# Patient Record
Sex: MALE | Race: WHITE | Employment: FULL TIME | URBAN - NONMETROPOLITAN AREA
[De-identification: names, ages, dates, MRNs, and addresses within clinical notes are randomized per-mention and may not be internally consistent; named-entity substitution may affect disease eponyms.]

---

## 2018-01-25 ENCOUNTER — APPOINTMENT (OUTPATIENT)
Dept: CT IMAGING | Age: 33
End: 2018-01-25
Payer: COMMERCIAL

## 2018-01-25 ENCOUNTER — HOSPITAL ENCOUNTER (EMERGENCY)
Age: 33
Discharge: HOME | End: 2018-01-25
Attending: EMERGENCY MEDICINE
Payer: COMMERCIAL

## 2018-01-25 VITALS
OXYGEN SATURATION: 97 % | DIASTOLIC BLOOD PRESSURE: 84 MMHG | HEIGHT: 70 IN | RESPIRATION RATE: 16 BRPM | TEMPERATURE: 98.4 F | WEIGHT: 170 LBS | HEART RATE: 55 BPM | SYSTOLIC BLOOD PRESSURE: 122 MMHG | BODY MASS INDEX: 24.34 KG/M2

## 2018-01-25 DIAGNOSIS — Q24.8 PERICARDIAL CYST: ICD-10-CM

## 2018-01-25 DIAGNOSIS — N13.2 HYDRONEPHROSIS WITH URINARY OBSTRUCTION DUE TO URETERAL CALCULUS: Primary | ICD-10-CM

## 2018-01-25 LAB
ALBUMIN SERPL-MCNC: 4.3 G/DL (ref 3.5–5.2)
ALP BLD-CCNC: 62 U/L (ref 40–130)
ALT SERPL-CCNC: 37 U/L (ref 5–41)
ANION GAP SERPL CALCULATED.3IONS-SCNC: 16 MMOL/L (ref 7–19)
AST SERPL-CCNC: 33 U/L (ref 5–40)
BACTERIA: NEGATIVE /HPF
BASOPHILS ABSOLUTE: 0.1 K/UL (ref 0–0.2)
BASOPHILS RELATIVE PERCENT: 0.5 % (ref 0–1)
BILIRUB SERPL-MCNC: 0.5 MG/DL (ref 0.2–1.2)
BILIRUBIN URINE: NEGATIVE
BLOOD, URINE: ABNORMAL
BUN BLDV-MCNC: 22 MG/DL (ref 6–20)
CALCIUM SERPL-MCNC: 9.3 MG/DL (ref 8.6–10)
CHLORIDE BLD-SCNC: 104 MMOL/L (ref 98–111)
CLARITY: ABNORMAL
CO2: 20 MMOL/L (ref 22–29)
COLOR: YELLOW
CREAT SERPL-MCNC: 0.9 MG/DL (ref 0.5–1.2)
EOSINOPHILS ABSOLUTE: 0.2 K/UL (ref 0–0.6)
EOSINOPHILS RELATIVE PERCENT: 1.8 % (ref 0–5)
EPITHELIAL CELLS, UA: 1 /HPF (ref 0–5)
GFR NON-AFRICAN AMERICAN: >60
GLUCOSE BLD-MCNC: 128 MG/DL (ref 74–109)
GLUCOSE URINE: NEGATIVE MG/DL
HCT VFR BLD CALC: 46.2 % (ref 42–52)
HEMOGLOBIN: 15.8 G/DL (ref 14–18)
HYALINE CASTS: 3 /HPF (ref 0–8)
KETONES, URINE: NEGATIVE MG/DL
LEUKOCYTE ESTERASE, URINE: NEGATIVE
LIPASE: 143 U/L (ref 13–60)
LYMPHOCYTES ABSOLUTE: 4.5 K/UL (ref 1.1–4.5)
LYMPHOCYTES RELATIVE PERCENT: 48 % (ref 20–40)
MCH RBC QN AUTO: 30.6 PG (ref 27–31)
MCHC RBC AUTO-ENTMCNC: 34.2 G/DL (ref 33–37)
MCV RBC AUTO: 89.4 FL (ref 80–94)
MONOCYTES ABSOLUTE: 1.2 K/UL (ref 0–0.9)
MONOCYTES RELATIVE PERCENT: 13.3 % (ref 0–10)
NEUTROPHILS ABSOLUTE: 3.4 K/UL (ref 1.5–7.5)
NEUTROPHILS RELATIVE PERCENT: 36.2 % (ref 50–65)
NITRITE, URINE: NEGATIVE
PDW BLD-RTO: 13 % (ref 11.5–14.5)
PH UA: 6
PLATELET # BLD: 291 K/UL (ref 130–400)
PMV BLD AUTO: 9.5 FL (ref 9.4–12.4)
POTASSIUM SERPL-SCNC: 3.7 MMOL/L (ref 3.5–5)
PROTEIN UA: NEGATIVE MG/DL
RBC # BLD: 5.17 M/UL (ref 4.7–6.1)
RBC UA: 17 /HPF (ref 0–4)
SODIUM BLD-SCNC: 140 MMOL/L (ref 136–145)
SPECIFIC GRAVITY UA: 1.03
TOTAL PROTEIN: 7.2 G/DL (ref 6.6–8.7)
UROBILINOGEN, URINE: 0.2 E.U./DL
WBC # BLD: 9.3 K/UL (ref 4.8–10.8)
WBC UA: 3 /HPF (ref 0–5)

## 2018-01-25 PROCEDURE — 6370000000 HC RX 637 (ALT 250 FOR IP): Performed by: EMERGENCY MEDICINE

## 2018-01-25 PROCEDURE — 96375 TX/PRO/DX INJ NEW DRUG ADDON: CPT

## 2018-01-25 PROCEDURE — 36415 COLL VENOUS BLD VENIPUNCTURE: CPT

## 2018-01-25 PROCEDURE — 2580000003 HC RX 258: Performed by: EMERGENCY MEDICINE

## 2018-01-25 PROCEDURE — 81001 URINALYSIS AUTO W/SCOPE: CPT

## 2018-01-25 PROCEDURE — 99284 EMERGENCY DEPT VISIT MOD MDM: CPT

## 2018-01-25 PROCEDURE — 83690 ASSAY OF LIPASE: CPT

## 2018-01-25 PROCEDURE — 99284 EMERGENCY DEPT VISIT MOD MDM: CPT | Performed by: EMERGENCY MEDICINE

## 2018-01-25 PROCEDURE — 85025 COMPLETE CBC W/AUTO DIFF WBC: CPT

## 2018-01-25 PROCEDURE — 96374 THER/PROPH/DIAG INJ IV PUSH: CPT

## 2018-01-25 PROCEDURE — 80053 COMPREHEN METABOLIC PANEL: CPT

## 2018-01-25 PROCEDURE — 74150 CT ABDOMEN W/O CONTRAST: CPT

## 2018-01-25 PROCEDURE — 6360000002 HC RX W HCPCS: Performed by: EMERGENCY MEDICINE

## 2018-01-25 PROCEDURE — 96376 TX/PRO/DX INJ SAME DRUG ADON: CPT

## 2018-01-25 RX ORDER — TAMSULOSIN HYDROCHLORIDE 0.4 MG/1
0.4 CAPSULE ORAL DAILY
Qty: 30 CAPSULE | Refills: 3 | Status: SHIPPED | OUTPATIENT
Start: 2018-01-25 | End: 2018-03-02 | Stop reason: ALTCHOICE

## 2018-01-25 RX ORDER — TAMSULOSIN HYDROCHLORIDE 0.4 MG/1
0.4 CAPSULE ORAL ONCE
Status: COMPLETED | OUTPATIENT
Start: 2018-01-25 | End: 2018-01-25

## 2018-01-25 RX ORDER — MORPHINE SULFATE 10 MG/ML
6 INJECTION, SOLUTION INTRAMUSCULAR; INTRAVENOUS ONCE
Status: COMPLETED | OUTPATIENT
Start: 2018-01-25 | End: 2018-01-25

## 2018-01-25 RX ORDER — DEXTROAMPHETAMINE SACCHARATE, AMPHETAMINE ASPARTATE, DEXTROAMPHETAMINE SULFATE AND AMPHETAMINE SULFATE 7.5; 7.5; 7.5; 7.5 MG/1; MG/1; MG/1; MG/1
30 TABLET ORAL DAILY
COMMUNITY

## 2018-01-25 RX ORDER — HYDROCODONE BITARTRATE AND ACETAMINOPHEN 5; 325 MG/1; MG/1
1 TABLET ORAL ONCE
Status: DISCONTINUED | OUTPATIENT
Start: 2018-01-25 | End: 2018-01-25

## 2018-01-25 RX ORDER — ONDANSETRON 4 MG/1
4 TABLET, ORALLY DISINTEGRATING ORAL EVERY 8 HOURS PRN
Qty: 30 TABLET | Refills: 0 | Status: SHIPPED | OUTPATIENT
Start: 2018-01-25

## 2018-01-25 RX ORDER — MORPHINE SULFATE 4 MG/ML
4 INJECTION, SOLUTION INTRAMUSCULAR; INTRAVENOUS ONCE
Status: COMPLETED | OUTPATIENT
Start: 2018-01-25 | End: 2018-01-25

## 2018-01-25 RX ORDER — KETOROLAC TROMETHAMINE 30 MG/ML
15 INJECTION, SOLUTION INTRAMUSCULAR; INTRAVENOUS ONCE
Status: COMPLETED | OUTPATIENT
Start: 2018-01-25 | End: 2018-01-25

## 2018-01-25 RX ORDER — ONDANSETRON 2 MG/ML
4 INJECTION INTRAMUSCULAR; INTRAVENOUS ONCE
Status: COMPLETED | OUTPATIENT
Start: 2018-01-25 | End: 2018-01-25

## 2018-01-25 RX ORDER — HYDROCODONE BITARTRATE AND ACETAMINOPHEN 5; 325 MG/1; MG/1
1 TABLET ORAL EVERY 6 HOURS PRN
Qty: 12 TABLET | Refills: 0 | Status: SHIPPED | OUTPATIENT
Start: 2018-01-25 | End: 2018-01-28

## 2018-01-25 RX ORDER — PROMETHAZINE HYDROCHLORIDE 25 MG/ML
12.5 INJECTION, SOLUTION INTRAMUSCULAR; INTRAVENOUS ONCE
Status: COMPLETED | OUTPATIENT
Start: 2018-01-25 | End: 2018-01-25

## 2018-01-25 RX ORDER — 0.9 % SODIUM CHLORIDE 0.9 %
1000 INTRAVENOUS SOLUTION INTRAVENOUS ONCE
Status: COMPLETED | OUTPATIENT
Start: 2018-01-25 | End: 2018-01-25

## 2018-01-25 RX ORDER — DOCUSATE SODIUM 100 MG/1
100 CAPSULE, LIQUID FILLED ORAL 2 TIMES DAILY
Qty: 30 CAPSULE | Refills: 0 | Status: SHIPPED | OUTPATIENT
Start: 2018-01-25 | End: 2022-03-11

## 2018-01-25 RX ADMIN — PROMETHAZINE HYDROCHLORIDE 12.5 MG: 25 INJECTION INTRAMUSCULAR; INTRAVENOUS at 06:46

## 2018-01-25 RX ADMIN — SODIUM CHLORIDE 1000 ML: 9 INJECTION, SOLUTION INTRAVENOUS at 05:03

## 2018-01-25 RX ADMIN — MORPHINE SULFATE 6 MG: 10 INJECTION INTRAVENOUS at 05:04

## 2018-01-25 RX ADMIN — KETOROLAC TROMETHAMINE 15 MG: 30 INJECTION, SOLUTION INTRAMUSCULAR at 05:07

## 2018-01-25 RX ADMIN — TAMSULOSIN HYDROCHLORIDE 0.4 MG: 0.4 CAPSULE ORAL at 05:07

## 2018-01-25 RX ADMIN — Medication 4 MG: at 05:21

## 2018-01-25 RX ADMIN — ONDANSETRON 4 MG: 2 INJECTION INTRAMUSCULAR; INTRAVENOUS at 05:04

## 2018-01-25 RX ADMIN — Medication 4 MG: at 06:54

## 2018-01-25 ASSESSMENT — PAIN DESCRIPTION - ORIENTATION: ORIENTATION: RIGHT

## 2018-01-25 ASSESSMENT — PAIN DESCRIPTION - DESCRIPTORS: DESCRIPTORS: STABBING

## 2018-01-25 ASSESSMENT — PAIN SCALES - GENERAL
PAINLEVEL_OUTOF10: 10
PAINLEVEL_OUTOF10: 5
PAINLEVEL_OUTOF10: 10
PAINLEVEL_OUTOF10: 6
PAINLEVEL_OUTOF10: 7
PAINLEVEL_OUTOF10: 6

## 2018-01-25 ASSESSMENT — PAIN DESCRIPTION - LOCATION: LOCATION: ABDOMEN

## 2018-01-25 ASSESSMENT — ENCOUNTER SYMPTOMS
VOMITING: 0
SHORTNESS OF BREATH: 0

## 2018-01-25 NOTE — ED PROVIDER NOTES
amphetamine-dextroamphetamine (ADDERALL) 30 MG tablet Take 30 mg by mouth daily. Historical Med             ALLERGIES     Review of patient's allergies indicates no known allergies. FAMILY HISTORY     No family history on file. SOCIAL HISTORY       Social History     Social History    Marital status:      Spouse name: N/A    Number of children: N/A    Years of education: N/A     Social History Main Topics    Smoking status: Not on file    Smokeless tobacco: Not on file    Alcohol use Not on file    Drug use: Unknown    Sexual activity: Not on file     Other Topics Concern    Not on file     Social History Narrative    No narrative on file       SCREENINGS             PHYSICAL EXAM    (up to 7 for level 4, 8 or more for level 5)     ED Triage Vitals   BP Temp Temp Source Pulse Resp SpO2 Height Weight   01/25/18 0454 01/25/18 0454 01/25/18 0454 01/25/18 0457 01/25/18 0454 01/25/18 0454 01/25/18 0454 01/25/18 0454   132/89 97.4 °F (36.3 °C) Oral 55 20 98 % 5' 10\" (1.778 m) 170 lb (77.1 kg)       Physical Exam   Constitutional: He is oriented to person, place, and time. He appears well-developed and well-nourished. In pain   HENT:   Head: Normocephalic and atraumatic. Eyes: EOM are normal. Pupils are equal, round, and reactive to light. Neck: Normal range of motion. Neck supple. Cardiovascular: Normal rate, regular rhythm and normal heart sounds. Pulmonary/Chest: Effort normal and breath sounds normal. No respiratory distress. He has no wheezes. Abdominal: Soft. Bowel sounds are normal. He exhibits no distension. There is no tenderness. There is no rebound. Musculoskeletal: He exhibits no edema or deformity. Mild right CVA tenderness   Neurological: He is alert and oriented to person, place, and time. Skin: Skin is warm and dry. Psychiatric:   Anxious in pain   Nursing note and vitals reviewed.       DIAGNOSTIC RESULTS     EKG: All EKG's are interpreted by the Emergency Glucose 128 (*)     BUN 22 (*)     All other components within normal limits   LIPASE - Abnormal; Notable for the following:     Lipase 143 (*)     All other components within normal limits   URINALYSIS - Abnormal; Notable for the following:     Clarity, UA TURBID (*)     Blood, Urine LARGE (*)     All other components within normal limits   MICROSCOPIC URINALYSIS - Abnormal; Notable for the following:     RBC, UA 17 (*)     All other components within normal limits       All other labs were within normal range or not returned as of this dictation. EMERGENCY DEPARTMENT COURSE and DIFFERENTIAL DIAGNOSIS/MDM:   Vitals:    Vitals:    01/25/18 0457 01/25/18 0541 01/25/18 0649 01/25/18 0834   BP:  (!) 121/90 (!) 122/90 122/84   Pulse: 55 61 52 55   Resp:  18 18 16   Temp:    98.4 °F (36.9 °C)   TempSrc:       SpO2:  97% 97% 97%   Weight:       Height:           MDM  Number of Diagnoses or Management Options  Hydronephrosis with urinary obstruction due to ureteral calculus:   Pericardial cyst:   Diagnosis management comments: Pt with UVJ R stone, IV pain and nausea meds. Had bout of n/v at time of DC so gave more meds. Turned over to Dr Kanu Marte to admit if needs pain control, but we will give it a couple more hours and if he is better can be dc to outpatient. Dr Kanu Marte to reassess after 0730. Discussed CT pericardial cyst and follow up with family as well, incidental.        Amount and/or Complexity of Data Reviewed  Clinical lab tests: ordered and reviewed  Tests in the radiology section of CPT®: ordered and reviewed  Obtain history from someone other than the patient: yes    Patient Progress  Patient progress: improved        CONSULTS:  None    PROCEDURES:  Unless otherwise noted below, none     Procedures    FINAL IMPRESSION      1. Hydronephrosis with urinary obstruction due to ureteral calculus    2.  Pericardial cyst          DISPOSITION/PLAN   DISPOSITION Decision To Discharge 01/25/2018 08:06:17

## 2018-01-25 NOTE — ED NOTES
Pt is resting comfortably in bed. Pt shows no s/s of distress. Call light is in reach of pt and pt encouraged to call if he needs anything.       Saturnino Ponce RN  01/25/18 1805

## 2018-01-25 NOTE — ED PROVIDER NOTES
Lymphocytes % 48.0 (*)     Monocytes % 13.3 (*)     Monocytes # 1.20 (*)     All other components within normal limits   COMPREHENSIVE METABOLIC PANEL - Abnormal; Notable for the following:     CO2 20 (*)     Glucose 128 (*)     BUN 22 (*)     All other components within normal limits   LIPASE - Abnormal; Notable for the following:     Lipase 143 (*)     All other components within normal limits   URINALYSIS - Abnormal; Notable for the following:     Clarity, UA TURBID (*)     Blood, Urine LARGE (*)     All other components within normal limits   MICROSCOPIC URINALYSIS - Abnormal; Notable for the following:     RBC, UA 17 (*)     All other components within normal limits       All other labs were within normal range or not returned as of this dictation.     EMERGENCY DEPARTMENT COURSE and DIFFERENTIAL DIAGNOSIS/MDM:   Vitals:    Vitals:    01/25/18 0454 01/25/18 0457 01/25/18 0541 01/25/18 0649   BP: 132/89  (!) 121/90 (!) 122/90   Pulse:  55 61 52   Resp: 20 18 18   Temp: 97.4 °F (36.3 °C)      TempSrc: Oral      SpO2: 98%  97% 97%   Weight: 170 lb (77.1 kg)      Height: 5' 10\" (1.778 m)          MDM  Number of Diagnoses or Management Options  Hydronephrosis with urinary obstruction due to ureteral calculus:   Pericardial cyst:      Amount and/or Complexity of Data Reviewed  Clinical lab tests: reviewed  Tests in the radiology section of CPT®: reviewed  Independent visualization of images, tracings, or specimens: yes        Right 3-4 mm UVJ stone, patient was still having some mild pain however is resting comfortably on my evaluation, family wants to make sure his pain control before going home so we'll continue to monitor for a little longer, 0741    Patient's pain has been well-controlled since taking over care he has had no episodes of vomiting and is ready to go home, discussed return precautions and follow-up, he and family are agreeable with plan    CONSULTS:  None    PROCEDURES:  Unless otherwise noted below, none     Procedures    FINAL IMPRESSION      1. Hydronephrosis with urinary obstruction due to ureteral calculus    2. Pericardial cyst          DISPOSITION/PLAN   DISPOSITION     PATIENT REFERRED TO:  Amaya Blanco MD  [address] [place] [address], [facility] [phone]    Schedule an appointment as soon as possible for a visit   MON if still having issues and pain    MD Escobar Fox , [facility]    Schedule an appointment as soon as possible for a visit   for incidental finding of pericardial cyst on CT      DISCHARGE MEDICATIONS:  New Prescriptions    DOCUSATE SODIUM (COLACE) 100 MG CAPSULE    Take 1 capsule by mouth 2 times daily    HYDROCODONE-ACETAMINOPHEN (NORCO) 5-325 MG PER TABLET    Take 1 tablet by mouth every 6 hours as needed for Pain for up to 3 days . Take lowest dose possible to manage pain. ONDANSETRON (ZOFRAN ODT) 4 MG DISINTEGRATING TABLET    Take 1 tablet by mouth every 8 hours as needed for Nausea or Vomiting    TAMSULOSIN (FLOMAX) 0.4 MG CAPSULE    Take 1 capsule by mouth daily     Attestation: The Prescription Monitoring Report for this patient was reviewed today. ([id number]) Juanis Bruce MD)  Documentation: Possible medication side effects, risk of tolerance and/or dependence, and alternative treatments discussed., No signs of potential drug abuse or diversion identified.  Juanis Bruce MD)    (Please note that portions of this note were completed with a voice recognition program.  Efforts were made to edit the dictations but occasionally words are mis-transcribed.)    Kiki Marte MD (electronically signed)  Attending Emergency Physician         Wenceslao Infante MD  01/25/18 8835

## 2018-03-02 ENCOUNTER — OFFICE VISIT (OUTPATIENT)
Dept: CARDIOLOGY | Age: 33
End: 2018-03-02
Payer: COMMERCIAL

## 2018-03-02 VITALS
WEIGHT: 169 LBS | BODY MASS INDEX: 24.2 KG/M2 | SYSTOLIC BLOOD PRESSURE: 120 MMHG | HEART RATE: 52 BPM | DIASTOLIC BLOOD PRESSURE: 68 MMHG | HEIGHT: 70 IN

## 2018-03-02 DIAGNOSIS — R00.2 PALPITATION: ICD-10-CM

## 2018-03-02 DIAGNOSIS — Q24.8 PERICARDIAL CYST: Primary | ICD-10-CM

## 2018-03-02 PROCEDURE — G8420 CALC BMI NORM PARAMETERS: HCPCS | Performed by: CLINICAL NURSE SPECIALIST

## 2018-03-02 PROCEDURE — G8427 DOCREV CUR MEDS BY ELIG CLIN: HCPCS | Performed by: CLINICAL NURSE SPECIALIST

## 2018-03-02 PROCEDURE — G8484 FLU IMMUNIZE NO ADMIN: HCPCS | Performed by: CLINICAL NURSE SPECIALIST

## 2018-03-02 PROCEDURE — 1036F TOBACCO NON-USER: CPT | Performed by: CLINICAL NURSE SPECIALIST

## 2018-03-02 PROCEDURE — 93000 ELECTROCARDIOGRAM COMPLETE: CPT | Performed by: CLINICAL NURSE SPECIALIST

## 2018-03-02 PROCEDURE — 99203 OFFICE O/P NEW LOW 30 MIN: CPT | Performed by: CLINICAL NURSE SPECIALIST

## 2018-03-12 ENCOUNTER — HOSPITAL ENCOUNTER (OUTPATIENT)
Dept: CT IMAGING | Age: 33
Discharge: HOME | End: 2018-03-12
Payer: COMMERCIAL

## 2018-03-12 DIAGNOSIS — Q24.8 PERICARDIAL CYST: ICD-10-CM

## 2018-03-12 PROCEDURE — 71270 CT THORAX DX C-/C+: CPT

## 2018-03-12 PROCEDURE — 6360000004 HC RX CONTRAST MEDICATION: Performed by: CLINICAL NURSE SPECIALIST

## 2018-03-12 RX ADMIN — IOPAMIDOL 60 ML: 755 INJECTION, SOLUTION INTRAVENOUS at 12:06

## 2018-03-13 DIAGNOSIS — Q24.8 PERICARDIAL CYST: Primary | ICD-10-CM

## 2018-03-20 ENCOUNTER — TELEPHONE (OUTPATIENT)
Dept: CARDIOTHORACIC SURGERY | Age: 33
End: 2018-03-20

## 2018-03-27 ENCOUNTER — OFFICE VISIT (OUTPATIENT)
Dept: CARDIOTHORACIC SURGERY | Age: 33
End: 2018-03-27
Payer: COMMERCIAL

## 2018-03-27 VITALS
HEIGHT: 70 IN | SYSTOLIC BLOOD PRESSURE: 116 MMHG | HEART RATE: 80 BPM | DIASTOLIC BLOOD PRESSURE: 80 MMHG | WEIGHT: 166 LBS | BODY MASS INDEX: 23.77 KG/M2 | OXYGEN SATURATION: 97 %

## 2018-03-27 DIAGNOSIS — Q24.8 PERICARDIAL CYST: Primary | ICD-10-CM

## 2018-03-27 PROCEDURE — G8427 DOCREV CUR MEDS BY ELIG CLIN: HCPCS | Performed by: THORACIC SURGERY (CARDIOTHORACIC VASCULAR SURGERY)

## 2018-03-27 PROCEDURE — G8420 CALC BMI NORM PARAMETERS: HCPCS | Performed by: THORACIC SURGERY (CARDIOTHORACIC VASCULAR SURGERY)

## 2018-03-27 PROCEDURE — G8484 FLU IMMUNIZE NO ADMIN: HCPCS | Performed by: THORACIC SURGERY (CARDIOTHORACIC VASCULAR SURGERY)

## 2018-03-27 PROCEDURE — 99203 OFFICE O/P NEW LOW 30 MIN: CPT | Performed by: THORACIC SURGERY (CARDIOTHORACIC VASCULAR SURGERY)

## 2018-03-27 PROCEDURE — 1036F TOBACCO NON-USER: CPT | Performed by: THORACIC SURGERY (CARDIOTHORACIC VASCULAR SURGERY)

## 2018-08-31 ENCOUNTER — APPOINTMENT (OUTPATIENT)
Dept: CT IMAGING | Age: 33
End: 2018-08-31
Payer: COMMERCIAL

## 2018-08-31 ENCOUNTER — HOSPITAL ENCOUNTER (EMERGENCY)
Age: 33
Discharge: HOME | End: 2018-09-01
Attending: EMERGENCY MEDICINE
Payer: COMMERCIAL

## 2018-08-31 DIAGNOSIS — N20.0 KIDNEY STONE: ICD-10-CM

## 2018-08-31 DIAGNOSIS — R10.9 LEFT FLANK PAIN: Primary | ICD-10-CM

## 2018-08-31 LAB
ALBUMIN SERPL-MCNC: 4.9 G/DL (ref 3.5–5.2)
ALP BLD-CCNC: 67 U/L (ref 40–130)
ALT SERPL-CCNC: 39 U/L (ref 5–41)
ANION GAP SERPL CALCULATED.3IONS-SCNC: 17 MMOL/L (ref 7–19)
AST SERPL-CCNC: 32 U/L (ref 5–40)
BASOPHILS ABSOLUTE: 0.1 K/UL (ref 0–0.2)
BASOPHILS RELATIVE PERCENT: 0.5 % (ref 0–1)
BILIRUB SERPL-MCNC: 0.8 MG/DL (ref 0.2–1.2)
BUN BLDV-MCNC: 20 MG/DL (ref 6–20)
CALCIUM SERPL-MCNC: 10 MG/DL (ref 8.6–10)
CHLORIDE BLD-SCNC: 101 MMOL/L (ref 98–111)
CO2: 23 MMOL/L (ref 22–29)
CREAT SERPL-MCNC: 1.1 MG/DL (ref 0.5–1.2)
EOSINOPHILS ABSOLUTE: 0.1 K/UL (ref 0–0.6)
EOSINOPHILS RELATIVE PERCENT: 1 % (ref 0–5)
GFR NON-AFRICAN AMERICAN: >60
GLUCOSE BLD-MCNC: 114 MG/DL (ref 74–109)
HCT VFR BLD CALC: 50 % (ref 42–52)
HEMOGLOBIN: 16.9 G/DL (ref 14–18)
LYMPHOCYTES ABSOLUTE: 3.8 K/UL (ref 1.1–4.5)
LYMPHOCYTES RELATIVE PERCENT: 36.2 % (ref 20–40)
MCH RBC QN AUTO: 30.7 PG (ref 27–31)
MCHC RBC AUTO-ENTMCNC: 33.8 G/DL (ref 33–37)
MCV RBC AUTO: 90.9 FL (ref 80–94)
MONOCYTES ABSOLUTE: 1.1 K/UL (ref 0–0.9)
MONOCYTES RELATIVE PERCENT: 10.8 % (ref 0–10)
NEUTROPHILS ABSOLUTE: 5.3 K/UL (ref 1.5–7.5)
NEUTROPHILS RELATIVE PERCENT: 51.2 % (ref 50–65)
PDW BLD-RTO: 12.6 % (ref 11.5–14.5)
PLATELET # BLD: 297 K/UL (ref 130–400)
PMV BLD AUTO: 9.1 FL (ref 9.4–12.4)
POTASSIUM SERPL-SCNC: 3.5 MMOL/L (ref 3.5–5)
RBC # BLD: 5.5 M/UL (ref 4.7–6.1)
SODIUM BLD-SCNC: 141 MMOL/L (ref 136–145)
TOTAL PROTEIN: 8.1 G/DL (ref 6.6–8.7)
WBC # BLD: 10.4 K/UL (ref 4.8–10.8)

## 2018-08-31 PROCEDURE — 85025 COMPLETE CBC W/AUTO DIFF WBC: CPT

## 2018-08-31 PROCEDURE — 6360000002 HC RX W HCPCS: Performed by: EMERGENCY MEDICINE

## 2018-08-31 PROCEDURE — 96375 TX/PRO/DX INJ NEW DRUG ADDON: CPT

## 2018-08-31 PROCEDURE — 36415 COLL VENOUS BLD VENIPUNCTURE: CPT

## 2018-08-31 PROCEDURE — 96374 THER/PROPH/DIAG INJ IV PUSH: CPT

## 2018-08-31 PROCEDURE — 99284 EMERGENCY DEPT VISIT MOD MDM: CPT

## 2018-08-31 PROCEDURE — 80053 COMPREHEN METABOLIC PANEL: CPT

## 2018-08-31 PROCEDURE — 99285 EMERGENCY DEPT VISIT HI MDM: CPT | Performed by: EMERGENCY MEDICINE

## 2018-08-31 PROCEDURE — 6360000002 HC RX W HCPCS

## 2018-08-31 PROCEDURE — 74150 CT ABDOMEN W/O CONTRAST: CPT

## 2018-08-31 RX ORDER — OXYCODONE HYDROCHLORIDE AND ACETAMINOPHEN 5; 325 MG/1; MG/1
1 TABLET ORAL EVERY 6 HOURS PRN
Qty: 12 TABLET | Refills: 0 | Status: SHIPPED | OUTPATIENT
Start: 2018-08-31 | End: 2018-09-03

## 2018-08-31 RX ORDER — KETOROLAC TROMETHAMINE 30 MG/ML
30 INJECTION, SOLUTION INTRAMUSCULAR; INTRAVENOUS ONCE
Status: COMPLETED | OUTPATIENT
Start: 2018-08-31 | End: 2018-08-31

## 2018-08-31 RX ORDER — LORAZEPAM 2 MG/ML
1 INJECTION INTRAMUSCULAR ONCE
Status: COMPLETED | OUTPATIENT
Start: 2018-08-31 | End: 2018-08-31

## 2018-08-31 RX ORDER — MORPHINE SULFATE/0.9% NACL/PF 1 MG/ML
4 SYRINGE (ML) INJECTION ONCE
Status: COMPLETED | OUTPATIENT
Start: 2018-08-31 | End: 2018-08-31

## 2018-08-31 RX ORDER — ONDANSETRON 2 MG/ML
INJECTION INTRAMUSCULAR; INTRAVENOUS
Status: COMPLETED
Start: 2018-08-31 | End: 2018-08-31

## 2018-08-31 RX ORDER — KETOROLAC TROMETHAMINE 10 MG/1
10 TABLET, FILM COATED ORAL EVERY 6 HOURS PRN
Qty: 15 TABLET | Refills: 0 | Status: SHIPPED | OUTPATIENT
Start: 2018-08-31

## 2018-08-31 RX ORDER — TAMSULOSIN HYDROCHLORIDE 0.4 MG/1
0.4 CAPSULE ORAL DAILY
Qty: 10 CAPSULE | Refills: 0 | Status: SHIPPED | OUTPATIENT
Start: 2018-08-31

## 2018-08-31 RX ORDER — ONDANSETRON 2 MG/ML
4 INJECTION INTRAMUSCULAR; INTRAVENOUS ONCE
Status: COMPLETED | OUTPATIENT
Start: 2018-08-31 | End: 2018-08-31

## 2018-08-31 RX ORDER — MORPHINE SULFATE/0.9% NACL/PF 1 MG/ML
SYRINGE (ML) INJECTION
Status: COMPLETED
Start: 2018-08-31 | End: 2018-08-31

## 2018-08-31 RX ADMIN — Medication 4 MG: at 21:10

## 2018-08-31 RX ADMIN — LORAZEPAM 1 MG: 2 INJECTION INTRAMUSCULAR; INTRAVENOUS at 21:21

## 2018-08-31 RX ADMIN — ONDANSETRON 4 MG: 2 INJECTION INTRAMUSCULAR; INTRAVENOUS at 21:10

## 2018-08-31 RX ADMIN — Medication 1 MG: at 21:21

## 2018-08-31 RX ADMIN — KETOROLAC TROMETHAMINE 30 MG: 30 INJECTION, SOLUTION INTRAMUSCULAR; INTRAVENOUS at 21:21

## 2018-08-31 RX ADMIN — ONDANSETRON HYDROCHLORIDE 4 MG: 2 INJECTION, SOLUTION INTRAMUSCULAR; INTRAVENOUS at 21:10

## 2018-08-31 ASSESSMENT — PAIN DESCRIPTION - PAIN TYPE: TYPE: ACUTE PAIN

## 2018-08-31 ASSESSMENT — PAIN SCALES - GENERAL
PAINLEVEL_OUTOF10: 10
PAINLEVEL_OUTOF10: 5
PAINLEVEL_OUTOF10: 10

## 2018-09-01 VITALS
RESPIRATION RATE: 16 BRPM | WEIGHT: 160 LBS | BODY MASS INDEX: 22.9 KG/M2 | OXYGEN SATURATION: 99 % | DIASTOLIC BLOOD PRESSURE: 80 MMHG | HEIGHT: 70 IN | HEART RATE: 93 BPM | TEMPERATURE: 98.7 F | SYSTOLIC BLOOD PRESSURE: 109 MMHG

## 2018-09-01 ASSESSMENT — ENCOUNTER SYMPTOMS
NAUSEA: 1
VOMITING: 1
ABDOMINAL PAIN: 1
WHEEZING: 0
SINUS PRESSURE: 0
BACK PAIN: 0
CONSTIPATION: 0
DIARRHEA: 0
COLOR CHANGE: 0
SHORTNESS OF BREATH: 0
PHOTOPHOBIA: 0
EYE PAIN: 0
CHEST TIGHTNESS: 0
TROUBLE SWALLOWING: 0

## 2018-09-01 ASSESSMENT — PAIN SCALES - GENERAL: PAINLEVEL_OUTOF10: 5

## 2018-09-01 ASSESSMENT — PAIN DESCRIPTION - PAIN TYPE: TYPE: ACUTE PAIN

## 2018-09-01 NOTE — ED PROVIDER NOTES
[facility]  eMERGENCY dEPARTMENT eNCOUnter      Pt Name: Gila Sandoval  MRN: [id number]  [place] 1985  Date of evaluation: 8/31/2018  Provider: Annika Denson MD    [address] [place] [address]       Chief Complaint   Patient presents with    Flank Pain     pt presents to ED with c/oleft flank pain that started tonight. know kidney stone         HISTORY OF PRESENT ILLNESS   (Location/Symptom, Timing/Onset, Context/Setting, Quality, Duration, Modifying Factors, Severity)  Note limiting factors. Gila Sandoval is a 35 y.o. male who presents to the emergency department for left flank pain. He has a hx of renal stones with the last one being approx 8 months ago. States that the pain began this evening. Pain was typical for renal stone. It is sharp with radiation to his left testicle. He is actively vomiting. HPI    Nursing Notes were reviewed. REVIEW OF SYSTEMS    (2-9 systems for level 4, 10 or more for level 5)     Review of Systems   Constitutional: Negative for fatigue and fever. HENT: Negative for congestion, drooling, sinus pressure and trouble swallowing. Eyes: Negative for photophobia, pain and visual disturbance. Respiratory: Negative for chest tightness, shortness of breath and wheezing. Cardiovascular: Negative for chest pain, palpitations and leg swelling. Gastrointestinal: Positive for abdominal pain, nausea and vomiting. Negative for constipation and diarrhea. Genitourinary: Positive for flank pain (left) and testicular pain (left). Negative for dysuria and urgency. Musculoskeletal: Negative for arthralgias, back pain, myalgias, neck pain and neck stiffness. Skin: Negative for color change, pallor and rash. Neurological: Negative for dizziness, facial asymmetry, speech difficulty, weakness, light-headedness, numbness and headaches. Psychiatric/Behavioral: Negative for agitation, confusion, hallucinations and suicidal ideas. The patient is not nervous/anxious.

## 2020-11-02 ENCOUNTER — OFFICE VISIT (OUTPATIENT)
Age: 35
End: 2020-11-02

## 2020-11-02 VITALS — TEMPERATURE: 96.9 F | OXYGEN SATURATION: 98 % | HEART RATE: 63 BPM

## 2020-11-02 PROCEDURE — 99999 PR OFFICE/OUTPT VISIT,PROCEDURE ONLY: CPT | Performed by: NURSE PRACTITIONER

## 2020-11-05 LAB — SARS-COV-2, NAA: DETECTED

## 2021-01-11 ENCOUNTER — TELEPHONE (OUTPATIENT)
Dept: FAMILY MEDICINE CLINIC | Facility: CLINIC | Age: 36
End: 2021-01-11

## 2021-01-11 ENCOUNTER — OFFICE VISIT (OUTPATIENT)
Dept: FAMILY MEDICINE CLINIC | Facility: CLINIC | Age: 36
End: 2021-01-11

## 2021-01-11 VITALS
HEART RATE: 56 BPM | OXYGEN SATURATION: 98 % | TEMPERATURE: 98.5 F | WEIGHT: 181.5 LBS | BODY MASS INDEX: 25.98 KG/M2 | RESPIRATION RATE: 16 BRPM | SYSTOLIC BLOOD PRESSURE: 110 MMHG | DIASTOLIC BLOOD PRESSURE: 77 MMHG | HEIGHT: 70 IN

## 2021-01-11 DIAGNOSIS — F90.2 ATTENTION DEFICIT HYPERACTIVITY DISORDER (ADHD), COMBINED TYPE: Primary | ICD-10-CM

## 2021-01-11 PROBLEM — F90.9 ADULT ATTENTION DEFICIT HYPERACTIVITY DISORDER: Status: ACTIVE | Noted: 2019-06-03

## 2021-01-11 PROBLEM — F90.9 ATTENTION DEFICIT HYPERACTIVITY DISORDER (ADHD): Status: ACTIVE | Noted: 2021-01-11

## 2021-01-11 PROBLEM — G47.00 INSOMNIA: Status: ACTIVE | Noted: 2021-01-11

## 2021-01-11 PROBLEM — R51.9 GENERALIZED HEADACHES: Status: ACTIVE | Noted: 2021-01-11

## 2021-01-11 PROCEDURE — 99214 OFFICE O/P EST MOD 30 MIN: CPT | Performed by: NURSE PRACTITIONER

## 2021-01-11 NOTE — PROGRESS NOTES
"Chief Complaint  ADHD    Subjective    History of Present Illness      Patient presents to [facility] for   ADHD/Mood HPI    Visit for:  follow-up. Most recent visit was 3 months ago.  Interim changes to follow up on today: no change in medication  Work/School Performance:  going well  Cognitive:  able to focus    Behavior  Hyperactivity: is not hyperactive  Impulsivity: no impulsivity  Tasking: able to initiate tasks    Social  ADHD social/impulsive symptoms:  not impatient    Behavioral health  Behavior: n/a  Emotional coping: demonstrates feelings of n/a         Review of Systems   All other systems reviewed and are negative.      I have reviewed and agree with the HPI and ROS information as above.  Astrid Keen, APRN     Objective   Vital Signs:   /77 (BP Location: Left arm, Patient Position: Sitting)   Pulse 56   Temp 98.5 °F (36.9 °C)   Resp 16   Ht 177.8 cm (70\")   Wt 82.3 kg (181 lb 8 oz)   SpO2 98%   BMI 26.04 kg/m²       Physical Exam  Constitutional:       Appearance: Normal appearance. He is well-developed.   HENT:      Head: Normocephalic and atraumatic.      Right Ear: External ear normal.      Left Ear: External ear normal.      Nose: Nose normal. No nasal tenderness or congestion.      Mouth/Throat:      Lips: Pink. No lesions.      Mouth: Mucous membranes are moist. No oral lesions.      Dentition: Normal dentition.      Pharynx: Oropharynx is clear. No pharyngeal swelling, oropharyngeal exudate or posterior oropharyngeal erythema.   Eyes:      General: Lids are normal. Vision grossly intact. No scleral icterus.        Right eye: No discharge.         Left eye: No discharge.      Extraocular Movements: Extraocular movements intact.      Conjunctiva/sclera: Conjunctivae normal.      Right eye: Right conjunctiva is not injected.      Left eye: Left conjunctiva is not injected.      Pupils: Pupils are equal, round, and reactive to light.   Neck: "      Musculoskeletal: Full passive range of motion without pain, normal range of motion and neck supple.   Cardiovascular:      Rate and Rhythm: Normal rate and regular rhythm.      Heart sounds: Normal heart sounds. No murmur. No gallop.    Pulmonary:      Effort: Pulmonary effort is normal.      Breath sounds: Normal breath sounds and air entry. No wheezing, rhonchi or rales.   Musculoskeletal: Normal range of motion.         General: No tenderness or deformity.      Right lower leg: No edema.      Left lower leg: No edema.   Skin:     General: Skin is warm and dry.      Coloration: Skin is not jaundiced.      Findings: No rash.   Neurological:      Mental Status: He is alert and oriented to person, place, and time.      Cranial Nerves: Cranial nerves are intact.      Sensory: Sensation is intact.      Motor: Motor function is intact.      Coordination: Coordination is intact.      Gait: Gait is intact.   Psychiatric:         Attention and Perception: Attention normal.         Mood and Affect: Mood and affect normal.         Behavior: Behavior is not hyperactive. Behavior is cooperative.         Thought Content: Thought content normal.         Judgment: Judgment normal.          Result Review  Data Reviewed:                   Assessment and Plan        Problem List Items Addressed This Visit     None      Visit Diagnoses     Attention deficit hyperactivity disorder (ADHD), combined type    -  Primary    Relevant Orders    Urine Drug Screen - Urine, Clean Catch      I discussed need for drug screen with pt. He said that it would be positive for marijuana. He is upset because he has got medication from us for years and never had a drug screen or this addressed. I discussed with Dr. Barone who still said he would have to have negative drug screen. Pt then requested to talk to Dr. Barone. Dr. Barone explained to pt that the government is controlling this so he will have to get off the marijuana. I had initially offered  to treat his symptoms that he was using the marijuana for as he stated he used it every night before bed. He feels that the marijuana helps his ADD symptoms as well. Pt told Dr. Barone that he would take home drug test before returning here to proceed with drug screen in order to get back on medication.         Follow Up   Return pending negative drug screen.  Patient was given instructions and counseling regarding his condition or for health maintenance advice. Please see specific information pulled into the AVS if appropriate.

## 2021-01-12 PROBLEM — F90.9 ATTENTION DEFICIT HYPERACTIVITY DISORDER (ADHD): Status: RESOLVED | Noted: 2021-01-11 | Resolved: 2021-01-12

## 2021-01-12 PROBLEM — R51.9 GENERALIZED HEADACHES: Status: RESOLVED | Noted: 2021-01-11 | Resolved: 2021-01-12

## 2021-01-15 ENCOUNTER — OFFICE VISIT (OUTPATIENT)
Dept: FAMILY MEDICINE CLINIC | Facility: CLINIC | Age: 36
End: 2021-01-15

## 2021-01-15 VITALS
SYSTOLIC BLOOD PRESSURE: 128 MMHG | TEMPERATURE: 97.8 F | OXYGEN SATURATION: 95 % | HEART RATE: 67 BPM | RESPIRATION RATE: 16 BRPM | WEIGHT: 184 LBS | HEIGHT: 70 IN | BODY MASS INDEX: 26.34 KG/M2 | DIASTOLIC BLOOD PRESSURE: 90 MMHG

## 2021-01-15 DIAGNOSIS — G43.909 MIGRAINE WITHOUT STATUS MIGRAINOSUS, NOT INTRACTABLE, UNSPECIFIED MIGRAINE TYPE: Chronic | ICD-10-CM

## 2021-01-15 DIAGNOSIS — F90.2 ATTENTION DEFICIT HYPERACTIVITY DISORDER (ADHD), COMBINED TYPE: Primary | Chronic | ICD-10-CM

## 2021-01-15 DIAGNOSIS — G47.00 INSOMNIA, UNSPECIFIED TYPE: Chronic | ICD-10-CM

## 2021-01-15 DIAGNOSIS — Z00.00 WELLNESS EXAMINATION: ICD-10-CM

## 2021-01-15 PROCEDURE — 99213 OFFICE O/P EST LOW 20 MIN: CPT | Performed by: NURSE PRACTITIONER

## 2021-01-15 RX ORDER — DEXTROAMPHETAMINE SULFATE, DEXTROAMPHETAMINE SACCHARATE, AMPHETAMINE SULFATE AND AMPHETAMINE ASPARTATE 7.5; 7.5; 7.5; 7.5 MG/1; MG/1; MG/1; MG/1
30 CAPSULE, EXTENDED RELEASE ORAL DAILY
Qty: 30 CAPSULE | Refills: 0 | Status: SHIPPED | OUTPATIENT
Start: 2021-01-15 | End: 2021-02-19 | Stop reason: SDUPTHER

## 2021-01-15 RX ORDER — TRAZODONE HYDROCHLORIDE 50 MG/1
50 TABLET ORAL NIGHTLY PRN
COMMUNITY
End: 2021-06-21 | Stop reason: SDUPTHER

## 2021-01-15 NOTE — PROGRESS NOTES
Subjective   Chief Complaint:  Visit to [facility], ADHD    History of Present Illness:  This 35 y.o. male was seen in the office today.  He has a new patient presenting today.    ADHD: Reports compliant hyperactive and inattentive.  He has been on Adderall 30 mg extended release daily, reports most of his adult life.  Reports good effectiveness and no side effects including shortness of breath, chest pain or palpitations.  Reports he takes an occasional 10 mg Adderall in addition due to midnight work in the medication wearing off.    Headaches: Reports migraine headaches, reports he takes occasional Imitrex, reports this is currently stable on current medication.    Insomnia: On trazodone 50 mg p.o. nightly as needed, denies any issues with this.    No Known Allergies   Current Outpatient Medications on File Prior to Visit   Medication Sig   • amphetamine-dextroamphetamine (ADDERALL) 10 MG tablet Take 10 mg by mouth Daily.   • SUMAtriptan (Imitrex) 50 MG tablet Take 50 mg by mouth Daily As Needed for Headache. May repeat in 2 hours if needed.   • traZODone (DESYREL) 50 MG tablet Take 50 mg by mouth At Night As Needed for Sleep. Takes 1/2 tablet as needed.     No current facility-administered medications on file prior to visit.       Past Medical, Surgical, Social, and Family History:  Past Medical History:   Diagnosis Date   • ADHD (attention deficit hyperactivity disorder)      Past Surgical History:   Procedure Laterality Date   • SHOULDER SURGERY Right      Social History     Socioeconomic History   • Marital status:      Spouse name: Not on file   • Number of children: Not on file   • Years of education: Not on file   • Highest education level: Not on file   Tobacco Use   • Smoking status: Never Smoker   • Smokeless tobacco: Never Used   Substance and Sexual Activity   • Alcohol use: Never     Frequency: Never   • Drug use: Never   • Sexual activity: Defer     Family History   Problem Relation Age of  "Onset   • Breast cancer Mother 53   • Melanoma Father    • Breast cancer Maternal Grandmother      Objective   Physical Exam  Vitals signs and nursing note reviewed.   Constitutional:       General: He is not in acute distress.     Appearance: He is not diaphoretic.   HENT:      Head: Normocephalic and atraumatic.      Nose: Nose normal.   Eyes:      Conjunctiva/sclera: Conjunctivae normal.      Pupils: Pupils are equal, round, and reactive to light.   Neck:      Musculoskeletal: Normal range of motion and neck supple.      Thyroid: No thyromegaly.      Vascular: No JVD.      Trachea: No tracheal deviation.   Cardiovascular:      Rate and Rhythm: Normal rate and regular rhythm.      Heart sounds: Normal heart sounds. No murmur. No friction rub. No gallop.    Pulmonary:      Effort: Pulmonary effort is normal. No respiratory distress.      Breath sounds: Normal breath sounds. No wheezing.   Abdominal:      General: Bowel sounds are normal.      Palpations: Abdomen is soft.      Tenderness: There is no abdominal tenderness. There is no guarding.   Musculoskeletal: Normal range of motion.         General: No tenderness or deformity.   Lymphadenopathy:      Cervical: No cervical adenopathy.   Skin:     General: Skin is warm and dry.      Capillary Refill: Capillary refill takes less than 2 seconds.   Neurological:      Mental Status: He is alert and oriented to person, place, and time.   Psychiatric:         Mood and Affect: Mood normal.         Behavior: Behavior normal.         Thought Content: Thought content normal.         Judgment: Judgment normal.      Comments: Openly voices feelings-does not voice any suicidal thoughts or ideation.  No irene.     /90 (BP Location: Left arm, Patient Position: Sitting, Cuff Size: Adult)   Pulse 67   Temp 97.8 °F (36.6 °C) (Infrared)   Resp 16   Ht 177.8 cm (70\")   Wt 83.5 kg (184 lb)   SpO2 95%   BMI 26.40 kg/m²     Assessment/Plan   Diagnoses and all orders for this " visit:    1. Attention deficit hyperactivity disorder (ADHD), combined type (Primary)  -     Adderall XR 30 MG 24 hr capsule; Take 1 capsule by mouth Daily  Dispense: 30 capsule; Refill: 0    2. Wellness examination  -     CBC & Differential  -     Comprehensive Metabolic Panel  -     Iron Profile  -     T4, Free  -     TSH    3. Insomnia, unspecified type    4. Migraine without status migrainosus, not intractable, unspecified migraine type    Discussion:  Advised and educated plan of care.  We will refill his Adderall today, advised policies regarding psychostimulants and need for keeping scheduled follow-ups.  We will get updated preventative care labs today.    Follow-up:  Return for 6 Months - PUMA Carey.    Note e-Signed by PUMA Pantoja on 01/15/2021 at 10:27 CST

## 2021-01-16 LAB
ALBUMIN SERPL-MCNC: 4.5 G/DL (ref 3.5–5.2)
ALBUMIN/GLOB SERPL: 2 G/DL
ALP SERPL-CCNC: 49 U/L (ref 39–117)
ALT SERPL-CCNC: 27 U/L (ref 1–41)
AST SERPL-CCNC: 41 U/L (ref 1–40)
BASOPHILS # BLD AUTO: 0.05 10*3/MM3 (ref 0–0.2)
BASOPHILS NFR BLD AUTO: 0.7 % (ref 0–1.5)
BILIRUB SERPL-MCNC: 0.3 MG/DL (ref 0–1.2)
BUN SERPL-MCNC: 25 MG/DL (ref 6–20)
BUN/CREAT SERPL: 26.3 (ref 7–25)
CALCIUM SERPL-MCNC: 9.3 MG/DL (ref 8.6–10.5)
CHLORIDE SERPL-SCNC: 104 MMOL/L (ref 98–107)
CO2 SERPL-SCNC: 28.4 MMOL/L (ref 22–29)
CREAT SERPL-MCNC: 0.95 MG/DL (ref 0.76–1.27)
EOSINOPHIL # BLD AUTO: 0.15 10*3/MM3 (ref 0–0.4)
EOSINOPHIL NFR BLD AUTO: 2.1 % (ref 0.3–6.2)
ERYTHROCYTE [DISTWIDTH] IN BLOOD BY AUTOMATED COUNT: 13.2 % (ref 12.3–15.4)
GLOBULIN SER CALC-MCNC: 2.3 GM/DL
GLUCOSE SERPL-MCNC: 61 MG/DL (ref 65–99)
HCT VFR BLD AUTO: 41.9 % (ref 37.5–51)
HGB BLD-MCNC: 14.5 G/DL (ref 13–17.7)
IMM GRANULOCYTES # BLD AUTO: 0.01 10*3/MM3 (ref 0–0.05)
IMM GRANULOCYTES NFR BLD AUTO: 0.1 % (ref 0–0.5)
IRON SATN MFR SERPL: 21 % (ref 20–50)
IRON SERPL-MCNC: 71 MCG/DL (ref 59–158)
LYMPHOCYTES # BLD AUTO: 2.04 10*3/MM3 (ref 0.7–3.1)
LYMPHOCYTES NFR BLD AUTO: 29 % (ref 19.6–45.3)
MCH RBC QN AUTO: 32.1 PG (ref 26.6–33)
MCHC RBC AUTO-ENTMCNC: 34.6 G/DL (ref 31.5–35.7)
MCV RBC AUTO: 92.7 FL (ref 79–97)
MONOCYTES # BLD AUTO: 0.76 10*3/MM3 (ref 0.1–0.9)
MONOCYTES NFR BLD AUTO: 10.8 % (ref 5–12)
NEUTROPHILS # BLD AUTO: 4.02 10*3/MM3 (ref 1.7–7)
NEUTROPHILS NFR BLD AUTO: 57.3 % (ref 42.7–76)
NRBC BLD AUTO-RTO: 0 /100 WBC (ref 0–0.2)
PLATELET # BLD AUTO: 263 10*3/MM3 (ref 140–450)
POTASSIUM SERPL-SCNC: 4 MMOL/L (ref 3.5–5.2)
PROT SERPL-MCNC: 6.8 G/DL (ref 6–8.5)
RBC # BLD AUTO: 4.52 10*6/MM3 (ref 4.14–5.8)
SODIUM SERPL-SCNC: 141 MMOL/L (ref 136–145)
T4 FREE SERPL-MCNC: 1.53 NG/DL (ref 0.93–1.7)
TIBC SERPL-MCNC: 345 MCG/DL
TSH SERPL DL<=0.005 MIU/L-ACNC: 1.49 UIU/ML (ref 0.27–4.2)
UIBC SERPL-MCNC: 274 MCG/DL (ref 112–346)
WBC # BLD AUTO: 7.03 10*3/MM3 (ref 3.4–10.8)

## 2021-01-17 NOTE — PROGRESS NOTES
Please call the patient - labs are good except patient needs to drink more and stay better hydrated.  Will see at next appointment as scheduled or earlier this year if any problems.

## 2021-02-19 DIAGNOSIS — F90.2 ATTENTION DEFICIT HYPERACTIVITY DISORDER (ADHD), COMBINED TYPE: Chronic | ICD-10-CM

## 2021-02-22 RX ORDER — DEXTROAMPHETAMINE SULFATE, DEXTROAMPHETAMINE SACCHARATE, AMPHETAMINE SULFATE AND AMPHETAMINE ASPARTATE 7.5; 7.5; 7.5; 7.5 MG/1; MG/1; MG/1; MG/1
30 CAPSULE, EXTENDED RELEASE ORAL DAILY
Qty: 30 CAPSULE | Refills: 0 | Status: SHIPPED | OUTPATIENT
Start: 2021-02-22 | End: 2021-03-25 | Stop reason: SDUPTHER

## 2021-02-22 RX ORDER — DEXTROAMPHETAMINE SACCHARATE, AMPHETAMINE ASPARTATE, DEXTROAMPHETAMINE SULFATE AND AMPHETAMINE SULFATE 2.5; 2.5; 2.5; 2.5 MG/1; MG/1; MG/1; MG/1
10 TABLET ORAL DAILY
Qty: 30 TABLET | Refills: 0 | Status: SHIPPED | OUTPATIENT
Start: 2021-02-22 | End: 2021-04-29 | Stop reason: SDUPTHER

## 2021-03-25 DIAGNOSIS — F90.2 ATTENTION DEFICIT HYPERACTIVITY DISORDER (ADHD), COMBINED TYPE: Chronic | ICD-10-CM

## 2021-03-25 RX ORDER — DEXTROAMPHETAMINE SULFATE, DEXTROAMPHETAMINE SACCHARATE, AMPHETAMINE SULFATE AND AMPHETAMINE ASPARTATE 7.5; 7.5; 7.5; 7.5 MG/1; MG/1; MG/1; MG/1
30 CAPSULE, EXTENDED RELEASE ORAL DAILY
Qty: 30 CAPSULE | Refills: 0 | Status: SHIPPED | OUTPATIENT
Start: 2021-03-25 | End: 2021-04-29 | Stop reason: SDUPTHER

## 2021-04-29 DIAGNOSIS — F90.2 ATTENTION DEFICIT HYPERACTIVITY DISORDER (ADHD), COMBINED TYPE: Chronic | ICD-10-CM

## 2021-04-30 RX ORDER — DEXTROAMPHETAMINE SULFATE, DEXTROAMPHETAMINE SACCHARATE, AMPHETAMINE SULFATE AND AMPHETAMINE ASPARTATE 7.5; 7.5; 7.5; 7.5 MG/1; MG/1; MG/1; MG/1
30 CAPSULE, EXTENDED RELEASE ORAL DAILY
Qty: 30 CAPSULE | Refills: 0 | Status: SHIPPED | OUTPATIENT
Start: 2021-04-30 | End: 2021-06-21 | Stop reason: SDUPTHER

## 2021-04-30 RX ORDER — DEXTROAMPHETAMINE SACCHARATE, AMPHETAMINE ASPARTATE, DEXTROAMPHETAMINE SULFATE AND AMPHETAMINE SULFATE 2.5; 2.5; 2.5; 2.5 MG/1; MG/1; MG/1; MG/1
10 TABLET ORAL DAILY
Qty: 30 TABLET | Refills: 0 | Status: SHIPPED | OUTPATIENT
Start: 2021-04-30 | End: 2021-06-15 | Stop reason: SDUPTHER

## 2021-06-15 DIAGNOSIS — F90.2 ATTENTION DEFICIT HYPERACTIVITY DISORDER (ADHD), COMBINED TYPE: Chronic | ICD-10-CM

## 2021-06-15 RX ORDER — DEXTROAMPHETAMINE SACCHARATE, AMPHETAMINE ASPARTATE, DEXTROAMPHETAMINE SULFATE AND AMPHETAMINE SULFATE 2.5; 2.5; 2.5; 2.5 MG/1; MG/1; MG/1; MG/1
10 TABLET ORAL DAILY
Qty: 30 TABLET | Refills: 0 | Status: SHIPPED | OUTPATIENT
Start: 2021-06-15 | End: 2021-07-20 | Stop reason: SDUPTHER

## 2021-06-15 NOTE — TELEPHONE ENCOUNTER
Caller: Rod Burnham    Relationship: Self    Best call back number: [phone]    Medication needed:   Requested Prescriptions     Pending Prescriptions Disp Refills   • amphetamine-dextroamphetamine (ADDERALL) 10 MG tablet 30 tablet 0     Sig: Take 1 tablet by mouth Daily.       When do you need the refill by:   ASAP    What additional details did the patient provide when requesting the medication:   PATIENT IS RUNNING LOW ON MEDICATION     Does the patient have less than a 3 day supply:  [x] Yes  [] No    What is the patient's preferred pharmacy: [facility] [address] [place] [address] [phone] [facility] [phone]

## 2021-06-21 DIAGNOSIS — F90.2 ATTENTION DEFICIT HYPERACTIVITY DISORDER (ADHD), COMBINED TYPE: Chronic | ICD-10-CM

## 2021-06-21 NOTE — TELEPHONE ENCOUNTER
Caller: Rod Burnham    Relationship: Self    Best call back number: [phone]    Medication needed:   Requested Prescriptions     Pending Prescriptions Disp Refills   • traZODone (DESYREL) 50 MG tablet       Sig: Take 1 tablet by mouth At Night As Needed for Sleep. Takes 1/2 tablet as needed.   • Adderall XR 30 MG 24 hr capsule 30 capsule 0     Sig: Take 1 capsule by mouth Daily       When do you need the refill by: ASAP    What additional details did the patient provide when requesting the medication: PATIENT STATES HE IS OUT OF MEDICATION     Does the patient have less than a 3 day supply:  [x] Yes  [] No    What is the patient's preferred pharmacy: [facility] [place] [address] [facility] [phone] [facility] [phone] FX

## 2021-06-21 NOTE — TELEPHONE ENCOUNTER
Requested Prescriptions     Pending Prescriptions Disp Refills   • traZODone (DESYREL) 50 MG tablet 30 tablet 3     Sig: Take 1 tablet by mouth At Night As Needed for Sleep.   • Adderall XR 30 MG 24 hr capsule 30 capsule 0     Sig: Take 1 capsule by mouth Daily

## 2021-06-22 RX ORDER — TRAZODONE HYDROCHLORIDE 50 MG/1
50 TABLET ORAL NIGHTLY PRN
Qty: 30 TABLET | Refills: 3 | Status: SHIPPED | OUTPATIENT
Start: 2021-06-22 | End: 2021-08-23 | Stop reason: SDUPTHER

## 2021-06-22 RX ORDER — DEXTROAMPHETAMINE SULFATE, DEXTROAMPHETAMINE SACCHARATE, AMPHETAMINE SULFATE AND AMPHETAMINE ASPARTATE 7.5; 7.5; 7.5; 7.5 MG/1; MG/1; MG/1; MG/1
30 CAPSULE, EXTENDED RELEASE ORAL DAILY
Qty: 30 CAPSULE | Refills: 0 | Status: SHIPPED | OUTPATIENT
Start: 2021-06-22 | End: 2021-07-20 | Stop reason: SDUPTHER

## 2021-07-20 ENCOUNTER — OFFICE VISIT (OUTPATIENT)
Dept: FAMILY MEDICINE CLINIC | Facility: CLINIC | Age: 36
End: 2021-07-20

## 2021-07-20 VITALS
TEMPERATURE: 97.8 F | WEIGHT: 171 LBS | SYSTOLIC BLOOD PRESSURE: 122 MMHG | DIASTOLIC BLOOD PRESSURE: 80 MMHG | RESPIRATION RATE: 20 BRPM | HEART RATE: 72 BPM | HEIGHT: 70 IN | BODY MASS INDEX: 24.48 KG/M2

## 2021-07-20 DIAGNOSIS — G43.909 MIGRAINE WITHOUT STATUS MIGRAINOSUS, NOT INTRACTABLE, UNSPECIFIED MIGRAINE TYPE: ICD-10-CM

## 2021-07-20 DIAGNOSIS — G47.00 INSOMNIA, UNSPECIFIED TYPE: ICD-10-CM

## 2021-07-20 DIAGNOSIS — F90.2 ATTENTION DEFICIT HYPERACTIVITY DISORDER (ADHD), COMBINED TYPE: Primary | ICD-10-CM

## 2021-07-20 PROCEDURE — 99214 OFFICE O/P EST MOD 30 MIN: CPT | Performed by: NURSE PRACTITIONER

## 2021-07-20 RX ORDER — DEXTROAMPHETAMINE SACCHARATE, AMPHETAMINE ASPARTATE, DEXTROAMPHETAMINE SULFATE AND AMPHETAMINE SULFATE 2.5; 2.5; 2.5; 2.5 MG/1; MG/1; MG/1; MG/1
10 TABLET ORAL DAILY
Qty: 30 TABLET | Refills: 0 | Status: SHIPPED | OUTPATIENT
Start: 2021-07-20 | End: 2021-08-23 | Stop reason: SDUPTHER

## 2021-07-20 RX ORDER — DEXTROAMPHETAMINE SULFATE, DEXTROAMPHETAMINE SACCHARATE, AMPHETAMINE SULFATE AND AMPHETAMINE ASPARTATE 7.5; 7.5; 7.5; 7.5 MG/1; MG/1; MG/1; MG/1
30 CAPSULE, EXTENDED RELEASE ORAL DAILY
Qty: 30 CAPSULE | Refills: 0 | Status: SHIPPED | OUTPATIENT
Start: 2021-07-20 | End: 2021-08-23 | Stop reason: SDUPTHER

## 2021-07-20 NOTE — PROGRESS NOTES
Subjective   Chief Complaint:  Medication review    History of Present Illness:  This 36 y.o. male was seen in the office today.  He was seen for his regular 6-month follow-up.  He has migraines, ADD and insomnia.  He reports less than 2 migraines per month, uses sumatriptan as needed.  He is on Adderall for attention deficit, he reports he has been on this since childhood.  He reports doing well on current dosage.  He reports his sleep is improved since last visit after starting trazodone 50 mg at night as needed.    No Known Allergies   Current Outpatient Medications on File Prior to Visit   Medication Sig   • Adderall XR 30 MG 24 hr capsule Take 1 capsule by mouth Daily   • amphetamine-dextroamphetamine (ADDERALL) 10 MG tablet Take 1 tablet by mouth Daily.   • SUMAtriptan (Imitrex) 50 MG tablet Take 50 mg by mouth Daily As Needed for Headache. May repeat in 2 hours if needed.   • traZODone (DESYREL) 50 MG tablet Take 1 tablet by mouth At Night As Needed for Sleep.     No current facility-administered medications on file prior to visit.      Past Medical, Surgical, Social, and Family History:  Past Medical History:   Diagnosis Date   • ADHD (attention deficit hyperactivity disorder)      Past Surgical History:   Procedure Laterality Date   • SHOULDER SURGERY Right      Social History     Socioeconomic History   • Marital status:      Spouse name: Not on file   • Number of children: Not on file   • Years of education: Not on file   • Highest education level: Not on file   Tobacco Use   • Smoking status: Never Smoker   • Smokeless tobacco: Never Used   Substance and Sexual Activity   • Alcohol use: Never   • Drug use: Never   • Sexual activity: Defer     Family History   Problem Relation Age of Onset   • Breast cancer Mother 53   • Melanoma Father    • Breast cancer Maternal Grandmother      Objective   Physical Exam  Vitals and nursing note reviewed.   Constitutional:       General: He is not in acute  "distress.     Appearance: He is not diaphoretic.   HENT:      Head: Normocephalic and atraumatic.      Nose: Nose normal.   Eyes:      Conjunctiva/sclera: Conjunctivae normal.      Pupils: Pupils are equal, round, and reactive to light.   Neck:      Thyroid: No thyromegaly.      Vascular: No JVD.      Trachea: No tracheal deviation.   Cardiovascular:      Rate and Rhythm: Normal rate and regular rhythm.      Heart sounds: Normal heart sounds. No murmur heard.   No friction rub. No gallop.    Pulmonary:      Effort: Pulmonary effort is normal. No respiratory distress.      Breath sounds: Normal breath sounds. No wheezing.   Abdominal:      General: Bowel sounds are normal.      Palpations: Abdomen is soft.      Tenderness: There is no abdominal tenderness. There is no guarding.   Musculoskeletal:         General: No tenderness or deformity. Normal range of motion.      Cervical back: Normal range of motion and neck supple.   Lymphadenopathy:      Cervical: No cervical adenopathy.   Skin:     General: Skin is warm and dry.      Capillary Refill: Capillary refill takes less than 2 seconds.   Neurological:      Mental Status: He is alert and oriented to person, place, and time.   Psychiatric:         Mood and Affect: Mood normal.         Behavior: Behavior normal.         Thought Content: Thought content normal.         Judgment: Judgment normal.     /80   Pulse 72   Temp 97.8 °F (36.6 °C)   Resp 20   Ht 177.8 cm (70\")   Wt 77.6 kg (171 lb)   BMI 24.54 kg/m²     Assessment/Plan   Diagnoses and all orders for this visit:    1. Attention deficit hyperactivity disorder (ADHD), combined type (Primary)    2. Insomnia, unspecified type    3. Migraine without status migrainosus, not intractable, unspecified migraine type    Discussion:  Advised and educated plan of care.  Advised to keep a 6-month follow-up.    Follow-up:  Return for 6 Months - PUMA Carey.    Electronically signed by PUMA Pantoja, " 07/20/21, 8:59 AM CDT.

## 2021-08-23 DIAGNOSIS — F90.2 ATTENTION DEFICIT HYPERACTIVITY DISORDER (ADHD), COMBINED TYPE: ICD-10-CM

## 2021-08-23 NOTE — TELEPHONE ENCOUNTER
Caller: Rod Burnham    Relationship: Self    Best call back number: [phone]    Medication needed:   Requested Prescriptions     Pending Prescriptions Disp Refills   • Adderall XR 30 MG 24 hr capsule 30 capsule 0     Sig: Take 1 capsule by mouth Daily Fill when allowed   • amphetamine-dextroamphetamine (ADDERALL) 10 MG tablet 30 tablet 0     Sig: Take 1 tablet by mouth Daily. Fill when allowed   • traZODone (DESYREL) 50 MG tablet 30 tablet 3     Sig: Take 1 tablet by mouth At Night As Needed for Sleep.    HE IS OUT     Does the patient have less than a 3 day supply:  [x] Yes  [] No    What is the patient's preferred pharmacy: [facility] [address] [facility] [phone] [facility] [phone] FX

## 2021-08-24 RX ORDER — DEXTROAMPHETAMINE SACCHARATE, AMPHETAMINE ASPARTATE, DEXTROAMPHETAMINE SULFATE AND AMPHETAMINE SULFATE 2.5; 2.5; 2.5; 2.5 MG/1; MG/1; MG/1; MG/1
10 TABLET ORAL DAILY
Qty: 30 TABLET | Refills: 0 | Status: SHIPPED | OUTPATIENT
Start: 2021-08-24 | End: 2021-10-07 | Stop reason: SDUPTHER

## 2021-08-24 RX ORDER — DEXTROAMPHETAMINE SULFATE, DEXTROAMPHETAMINE SACCHARATE, AMPHETAMINE SULFATE AND AMPHETAMINE ASPARTATE 7.5; 7.5; 7.5; 7.5 MG/1; MG/1; MG/1; MG/1
30 CAPSULE, EXTENDED RELEASE ORAL DAILY
Qty: 30 CAPSULE | Refills: 0 | Status: SHIPPED | OUTPATIENT
Start: 2021-08-24 | End: 2021-10-07 | Stop reason: SDUPTHER

## 2021-08-24 RX ORDER — TRAZODONE HYDROCHLORIDE 50 MG/1
50 TABLET ORAL NIGHTLY PRN
Qty: 30 TABLET | Refills: 3 | Status: SHIPPED | OUTPATIENT
Start: 2021-08-24 | End: 2021-11-12 | Stop reason: SDUPTHER

## 2021-10-07 DIAGNOSIS — F90.2 ATTENTION DEFICIT HYPERACTIVITY DISORDER (ADHD), COMBINED TYPE: ICD-10-CM

## 2021-10-07 NOTE — TELEPHONE ENCOUNTER
Caller: Rod Burnham    Relationship: Self    Medication requested (name and dosage): ADDERALL 10 MG  ADDERALL XR 30 MG    Pharmacy where request should be sent: [facility] - [place], [place]    Best call back number: [phone]    Does the patient have less than a 3 day supply:  [x] Yes  [] No    Odell Hays Rep   10/07/21 12:51 CDT

## 2021-10-11 RX ORDER — DEXTROAMPHETAMINE SULFATE, DEXTROAMPHETAMINE SACCHARATE, AMPHETAMINE SULFATE AND AMPHETAMINE ASPARTATE 7.5; 7.5; 7.5; 7.5 MG/1; MG/1; MG/1; MG/1
30 CAPSULE, EXTENDED RELEASE ORAL DAILY
Qty: 30 CAPSULE | Refills: 0 | Status: SHIPPED | OUTPATIENT
Start: 2021-10-11 | End: 2021-11-12 | Stop reason: SDUPTHER

## 2021-10-11 RX ORDER — DEXTROAMPHETAMINE SACCHARATE, AMPHETAMINE ASPARTATE, DEXTROAMPHETAMINE SULFATE AND AMPHETAMINE SULFATE 2.5; 2.5; 2.5; 2.5 MG/1; MG/1; MG/1; MG/1
10 TABLET ORAL DAILY
Qty: 30 TABLET | Refills: 0 | Status: SHIPPED | OUTPATIENT
Start: 2021-10-11 | End: 2021-11-12 | Stop reason: SDUPTHER

## 2021-11-12 ENCOUNTER — TELEPHONE (OUTPATIENT)
Dept: FAMILY MEDICINE CLINIC | Facility: CLINIC | Age: 36
End: 2021-11-12

## 2021-11-12 DIAGNOSIS — F90.2 ATTENTION DEFICIT HYPERACTIVITY DISORDER (ADHD), COMBINED TYPE: ICD-10-CM

## 2021-11-12 RX ORDER — DEXTROAMPHETAMINE SULFATE, DEXTROAMPHETAMINE SACCHARATE, AMPHETAMINE SULFATE AND AMPHETAMINE ASPARTATE 7.5; 7.5; 7.5; 7.5 MG/1; MG/1; MG/1; MG/1
30 CAPSULE, EXTENDED RELEASE ORAL DAILY
Qty: 30 CAPSULE | Refills: 0 | Status: SHIPPED | OUTPATIENT
Start: 2021-11-12 | End: 2021-12-13 | Stop reason: SDUPTHER

## 2021-11-12 RX ORDER — TRAZODONE HYDROCHLORIDE 50 MG/1
50 TABLET ORAL NIGHTLY PRN
Qty: 30 TABLET | Refills: 3 | Status: SHIPPED | OUTPATIENT
Start: 2021-11-12 | End: 2022-01-21 | Stop reason: SDUPTHER

## 2021-11-12 RX ORDER — DEXTROAMPHETAMINE SACCHARATE, AMPHETAMINE ASPARTATE, DEXTROAMPHETAMINE SULFATE AND AMPHETAMINE SULFATE 2.5; 2.5; 2.5; 2.5 MG/1; MG/1; MG/1; MG/1
10 TABLET ORAL DAILY
Qty: 30 TABLET | Refills: 0 | Status: SHIPPED | OUTPATIENT
Start: 2021-11-12 | End: 2021-12-13 | Stop reason: SDUPTHER

## 2021-11-12 NOTE — TELEPHONE ENCOUNTER
Caller: Rod Burnham    Relationship: Self    Best call back number: [phone]    Requested Prescriptions:   Requested Prescriptions     Pending Prescriptions Disp Refills   • amphetamine-dextroamphetamine (ADDERALL) 10 MG tablet 30 tablet 0     Sig: Take 1 tablet by mouth Daily. Fill when allowed   • Adderall XR 30 MG 24 hr capsule 30 capsule 0     Sig: Take 1 capsule by mouth Daily Fill when allowed   • traZODone (DESYREL) 50 MG tablet 30 tablet 3     Sig: Take 1 tablet by mouth At Night As Needed for Sleep.        Pharmacy where request should be sent:  [place], [place] - [address] [place] [address] - [phone]  - [phone]   [phone]    Additional details provided by patient: LESS THAN THREE DAYS    Does the patient have less than a 3 day supply:  [x] Yes  [] No    Odell Parra Rep   11/12/21 12:51 CST

## 2021-12-13 DIAGNOSIS — F90.2 ATTENTION DEFICIT HYPERACTIVITY DISORDER (ADHD), COMBINED TYPE: ICD-10-CM

## 2021-12-13 RX ORDER — DEXTROAMPHETAMINE SULFATE, DEXTROAMPHETAMINE SACCHARATE, AMPHETAMINE SULFATE AND AMPHETAMINE ASPARTATE 7.5; 7.5; 7.5; 7.5 MG/1; MG/1; MG/1; MG/1
30 CAPSULE, EXTENDED RELEASE ORAL DAILY
Qty: 30 CAPSULE | Refills: 0 | Status: SHIPPED | OUTPATIENT
Start: 2021-12-13 | End: 2022-01-21 | Stop reason: SDUPTHER

## 2021-12-13 RX ORDER — DEXTROAMPHETAMINE SACCHARATE, AMPHETAMINE ASPARTATE, DEXTROAMPHETAMINE SULFATE AND AMPHETAMINE SULFATE 2.5; 2.5; 2.5; 2.5 MG/1; MG/1; MG/1; MG/1
10 TABLET ORAL DAILY
Qty: 30 TABLET | Refills: 0 | Status: SHIPPED | OUTPATIENT
Start: 2021-12-13 | End: 2022-01-21 | Stop reason: SDUPTHER

## 2021-12-13 NOTE — TELEPHONE ENCOUNTER
Caller: Rod Burnham    Relationship: Self    Best call back number: [phone]    Requested Prescriptions:   Requested Prescriptions     Pending Prescriptions Disp Refills   • Adderall XR 30 MG 24 hr capsule 30 capsule 0     Sig: Take 1 capsule by mouth Daily Fill when allowed   • amphetamine-dextroamphetamine (ADDERALL) 10 MG tablet 30 tablet 0     Sig: Take 1 tablet by mouth Daily. Fill when allowed        Pharmacy where request should be sent:  [facility]    Additional details provided by patient: PATIENT WILL BE OUT BY TOMORROW    Does the patient have less than a 3 day supply:  [x] Yes  [] No    Odell Reyes Rep   12/13/21 11:12 CST

## 2021-12-13 NOTE — TELEPHONE ENCOUNTER
Last filled 11/12/2021 per [facility]        Requested Prescriptions     Pending Prescriptions Disp Refills   • Adderall XR 30 MG 24 hr capsule 30 capsule 0     Sig: Take 1 capsule by mouth Daily Fill when allowed   • amphetamine-dextroamphetamine (ADDERALL) 10 MG tablet 30 tablet 0     Sig: Take 1 tablet by mouth Daily. Fill when allowed

## 2022-01-21 ENCOUNTER — OFFICE VISIT (OUTPATIENT)
Dept: FAMILY MEDICINE CLINIC | Facility: CLINIC | Age: 37
End: 2022-01-21

## 2022-01-21 ENCOUNTER — PATIENT ROUNDING (BHMG ONLY) (OUTPATIENT)
Dept: FAMILY MEDICINE CLINIC | Facility: CLINIC | Age: 37
End: 2022-01-21

## 2022-01-21 VITALS
OXYGEN SATURATION: 97 % | WEIGHT: 166 LBS | RESPIRATION RATE: 16 BRPM | HEART RATE: 84 BPM | TEMPERATURE: 97.5 F | SYSTOLIC BLOOD PRESSURE: 118 MMHG | HEIGHT: 70 IN | DIASTOLIC BLOOD PRESSURE: 74 MMHG | BODY MASS INDEX: 23.77 KG/M2

## 2022-01-21 DIAGNOSIS — R55 POSTURAL DIZZINESS WITH PRESYNCOPE: ICD-10-CM

## 2022-01-21 DIAGNOSIS — Z00.00 WELLNESS EXAMINATION: Primary | ICD-10-CM

## 2022-01-21 DIAGNOSIS — R42 POSTURAL DIZZINESS WITH PRESYNCOPE: ICD-10-CM

## 2022-01-21 DIAGNOSIS — F51.01 PRIMARY INSOMNIA: ICD-10-CM

## 2022-01-21 DIAGNOSIS — F90.2 ATTENTION DEFICIT HYPERACTIVITY DISORDER (ADHD), COMBINED TYPE: ICD-10-CM

## 2022-01-21 PROCEDURE — 99214 OFFICE O/P EST MOD 30 MIN: CPT | Performed by: NURSE PRACTITIONER

## 2022-01-21 RX ORDER — TRAZODONE HYDROCHLORIDE 50 MG/1
50 TABLET ORAL NIGHTLY PRN
Qty: 30 TABLET | Refills: 5 | Status: SHIPPED | OUTPATIENT
Start: 2022-01-21 | End: 2022-07-21 | Stop reason: SDUPTHER

## 2022-01-21 RX ORDER — DEXTROAMPHETAMINE SACCHARATE, AMPHETAMINE ASPARTATE, DEXTROAMPHETAMINE SULFATE AND AMPHETAMINE SULFATE 2.5; 2.5; 2.5; 2.5 MG/1; MG/1; MG/1; MG/1
10 TABLET ORAL DAILY
Qty: 30 TABLET | Refills: 0 | Status: SHIPPED | OUTPATIENT
Start: 2022-01-21 | End: 2022-01-24

## 2022-01-21 RX ORDER — DEXTROAMPHETAMINE SULFATE, DEXTROAMPHETAMINE SACCHARATE, AMPHETAMINE SULFATE AND AMPHETAMINE ASPARTATE 7.5; 7.5; 7.5; 7.5 MG/1; MG/1; MG/1; MG/1
30 CAPSULE, EXTENDED RELEASE ORAL DAILY
Qty: 30 CAPSULE | Refills: 0 | Status: SHIPPED | OUTPATIENT
Start: 2022-01-21 | End: 2022-01-24

## 2022-01-21 NOTE — PROGRESS NOTES
January 21, 2022    Hello, may I speak with Rod Burnham?    My name is Richelle    I am  with [facility]  [address] [facility] [address] [phone].    Before we get started may I verify your date of birth? 1985    I am calling to officially welcome you to our practice and ask about your recent visit. Is this a good time to talk? yes    Tell me about your visit with us. What things went well?  visit was great       We're always looking for ways to make our patients' experiences even better. Do you have recommendations on ways we may improve?  no    Overall were you satisfied with your first visit to our practice? yes       I appreciate you taking the time to speak with me today. Is there anything else I can do for you? no      Thank you, and have a great day.

## 2022-01-22 NOTE — PROGRESS NOTES
Subjective   Chief Complaint:  Evaluation medication use    History of Present Illness:  This 36 y.o. male was seen in the office today.    Wellness exam: Due for lab-agreeable to get updated.    ADHD: On Adderall long-acting with a booster dose midday, doing well on current medication, reports focus good, denies chest pain, palpitations or ill effects.    Insomnia: Reports sleeps well using trazodone as needed.    Dizziness: Reports anytime he moves his head upward such as to get a dental exam he almost passes out and things get blurry.  Reports vision changes with this.    No Known Allergies   Current Outpatient Medications on File Prior to Visit   Medication Sig   • SUMAtriptan (Imitrex) 50 MG tablet Take 50 mg by mouth Daily As Needed for Headache. May repeat in 2 hours if needed.     No current facility-administered medications on file prior to visit.      Past Medical, Surgical, Social, and Family History:  Past Medical History:   Diagnosis Date   • ADHD (attention deficit hyperactivity disorder)      Past Surgical History:   Procedure Laterality Date   • SHOULDER SURGERY Right      Social History     Socioeconomic History   • Marital status:    Tobacco Use   • Smoking status: Never Smoker   • Smokeless tobacco: Never Used   Substance and Sexual Activity   • Alcohol use: Never   • Drug use: Never   • Sexual activity: Defer     Family History   Problem Relation Age of Onset   • Breast cancer Mother 53   • Melanoma Father    • Breast cancer Maternal Grandmother      Objective   Physical Exam  Vitals and nursing note reviewed.   Constitutional:       General: He is not in acute distress.     Appearance: He is not diaphoretic.   HENT:      Head: Normocephalic and atraumatic.      Nose: Nose normal.   Eyes:      Conjunctiva/sclera: Conjunctivae normal.      Pupils: Pupils are equal, round, and reactive to light.   Neck:      Thyroid: No thyromegaly.      Vascular: No carotid bruit or JVD.      Trachea: No  "tracheal deviation.   Cardiovascular:      Rate and Rhythm: Normal rate and regular rhythm.      Heart sounds: Normal heart sounds. No murmur heard.  No friction rub. No gallop.    Pulmonary:      Effort: Pulmonary effort is normal. No respiratory distress.      Breath sounds: Normal breath sounds. No wheezing.   Abdominal:      General: Bowel sounds are normal.      Palpations: Abdomen is soft.      Tenderness: There is no abdominal tenderness. There is no guarding.   Musculoskeletal:         General: No tenderness or deformity. Normal range of motion.      Cervical back: Normal range of motion and neck supple.   Lymphadenopathy:      Cervical: No cervical adenopathy.   Skin:     General: Skin is warm and dry.      Capillary Refill: Capillary refill takes less than 2 seconds.   Neurological:      Mental Status: He is alert and oriented to person, place, and time.   Psychiatric:         Behavior: Behavior normal.         Thought Content: Thought content normal.         Judgment: Judgment normal.     /74   Pulse 84   Temp 97.5 °F (36.4 °C)   Resp 16   Ht 177.8 cm (70\")   Wt 75.3 kg (166 lb)   SpO2 97%   BMI 23.82 kg/m²     Assessment/Plan   Diagnoses and all orders for this visit:    1. Wellness examination (Primary)  -     CBC & Differential  -     Comprehensive Metabolic Panel  -     Lipid Panel  -     TSH    2. Attention deficit hyperactivity disorder (ADHD), combined type  -     Adderall XR 30 MG 24 hr capsule; Take 1 capsule by mouth Daily Fill when allowed  Dispense: 30 capsule; Refill: 0  -     amphetamine-dextroamphetamine (ADDERALL) 10 MG tablet; Take 1 tablet by mouth Daily. Fill when allowed  Dispense: 30 tablet; Refill: 0    3. Primary insomnia  -     traZODone (DESYREL) 50 MG tablet; Take 1 tablet by mouth At Night As Needed for Sleep.  Dispense: 30 tablet; Refill: 5    4. Postural dizziness with presyncope  -     US Carotid Bilateral; Future    Discussion:  Advised and educated plan of " care.  We will proceed with carotid ultrasound, refilled meds, updated controlled substance contract, will get updated labs.    Patient's Body mass index is 23.82 kg/m². indicating that he is within normal range (BMI 18.5-24.9). No BMI management plan needed..    Follow-up:  Return in about 6 months (around 7/21/2022) for Follow-Up.    Electronically signed by PUMA Pantoja, 01/22/22, 2:35 PM CST.

## 2022-01-24 RX ORDER — DEXTROAMPHETAMINE SACCHARATE, AMPHETAMINE ASPARTATE, DEXTROAMPHETAMINE SULFATE AND AMPHETAMINE SULFATE 2.5; 2.5; 2.5; 2.5 MG/1; MG/1; MG/1; MG/1
10 TABLET ORAL DAILY
Qty: 30 TABLET | Refills: 0 | Status: SHIPPED | OUTPATIENT
Start: 2022-01-24 | End: 2022-03-25 | Stop reason: SDUPTHER

## 2022-01-24 RX ORDER — DEXTROAMPHETAMINE SACCHARATE, AMPHETAMINE ASPARTATE MONOHYDRATE, DEXTROAMPHETAMINE SULFATE AND AMPHETAMINE SULFATE 7.5; 7.5; 7.5; 7.5 MG/1; MG/1; MG/1; MG/1
30 CAPSULE, EXTENDED RELEASE ORAL DAILY
Qty: 30 CAPSULE | Refills: 0 | Status: SHIPPED | OUTPATIENT
Start: 2022-01-24 | End: 2022-02-25 | Stop reason: SDUPTHER

## 2022-02-01 ENCOUNTER — LAB (OUTPATIENT)
Dept: FAMILY MEDICINE CLINIC | Facility: CLINIC | Age: 37
End: 2022-02-01

## 2022-02-02 LAB
ALBUMIN SERPL-MCNC: 3.9 G/DL (ref 3.5–5.2)
ALBUMIN/GLOB SERPL: 1.6 G/DL
ALP SERPL-CCNC: 55 U/L (ref 39–117)
ALT SERPL-CCNC: 27 U/L (ref 1–41)
AST SERPL-CCNC: 26 U/L (ref 1–40)
BASOPHILS # BLD AUTO: 0.05 10*3/MM3 (ref 0–0.2)
BASOPHILS NFR BLD AUTO: 0.7 % (ref 0–1.5)
BILIRUB SERPL-MCNC: 0.4 MG/DL (ref 0–1.2)
BUN SERPL-MCNC: 27 MG/DL (ref 6–20)
BUN/CREAT SERPL: 22.1 (ref 7–25)
CALCIUM SERPL-MCNC: 9.1 MG/DL (ref 8.6–10.5)
CHLORIDE SERPL-SCNC: 103 MMOL/L (ref 98–107)
CHOLEST SERPL-MCNC: 109 MG/DL (ref 0–200)
CO2 SERPL-SCNC: 29.9 MMOL/L (ref 22–29)
CREAT SERPL-MCNC: 1.22 MG/DL (ref 0.76–1.27)
EOSINOPHIL # BLD AUTO: 0.16 10*3/MM3 (ref 0–0.4)
EOSINOPHIL NFR BLD AUTO: 2.3 % (ref 0.3–6.2)
ERYTHROCYTE [DISTWIDTH] IN BLOOD BY AUTOMATED COUNT: 12.4 % (ref 12.3–15.4)
GLOBULIN SER CALC-MCNC: 2.5 GM/DL
GLUCOSE SERPL-MCNC: 96 MG/DL (ref 65–99)
HCT VFR BLD AUTO: 39.9 % (ref 37.5–51)
HDLC SERPL-MCNC: 47 MG/DL (ref 40–60)
HGB BLD-MCNC: 13.9 G/DL (ref 13–17.7)
IMM GRANULOCYTES # BLD AUTO: 0.01 10*3/MM3 (ref 0–0.05)
IMM GRANULOCYTES NFR BLD AUTO: 0.1 % (ref 0–0.5)
LDLC SERPL CALC-MCNC: 46 MG/DL (ref 0–100)
LYMPHOCYTES # BLD AUTO: 2.41 10*3/MM3 (ref 0.7–3.1)
LYMPHOCYTES NFR BLD AUTO: 34.5 % (ref 19.6–45.3)
MCH RBC QN AUTO: 31.4 PG (ref 26.6–33)
MCHC RBC AUTO-ENTMCNC: 34.8 G/DL (ref 31.5–35.7)
MCV RBC AUTO: 90.1 FL (ref 79–97)
MONOCYTES # BLD AUTO: 0.95 10*3/MM3 (ref 0.1–0.9)
MONOCYTES NFR BLD AUTO: 13.6 % (ref 5–12)
NEUTROPHILS # BLD AUTO: 3.41 10*3/MM3 (ref 1.7–7)
NEUTROPHILS NFR BLD AUTO: 48.8 % (ref 42.7–76)
NRBC BLD AUTO-RTO: 0 /100 WBC (ref 0–0.2)
PLATELET # BLD AUTO: 271 10*3/MM3 (ref 140–450)
POTASSIUM SERPL-SCNC: 4.4 MMOL/L (ref 3.5–5.2)
PROT SERPL-MCNC: 6.4 G/DL (ref 6–8.5)
RBC # BLD AUTO: 4.43 10*6/MM3 (ref 4.14–5.8)
SODIUM SERPL-SCNC: 142 MMOL/L (ref 136–145)
TRIGL SERPL-MCNC: 81 MG/DL (ref 0–150)
TSH SERPL DL<=0.005 MIU/L-ACNC: 1.99 UIU/ML (ref 0.27–4.2)
VLDLC SERPL CALC-MCNC: 16 MG/DL (ref 5–40)
WBC # BLD AUTO: 6.99 10*3/MM3 (ref 3.4–10.8)

## 2022-02-25 DIAGNOSIS — F90.2 ATTENTION DEFICIT HYPERACTIVITY DISORDER (ADHD), COMBINED TYPE: ICD-10-CM

## 2022-02-25 RX ORDER — DEXTROAMPHETAMINE SACCHARATE, AMPHETAMINE ASPARTATE MONOHYDRATE, DEXTROAMPHETAMINE SULFATE AND AMPHETAMINE SULFATE 7.5; 7.5; 7.5; 7.5 MG/1; MG/1; MG/1; MG/1
30 CAPSULE, EXTENDED RELEASE ORAL DAILY
Qty: 30 CAPSULE | Refills: 0 | Status: SHIPPED | OUTPATIENT
Start: 2022-02-25 | End: 2022-03-25 | Stop reason: SDUPTHER

## 2022-02-25 NOTE — TELEPHONE ENCOUNTER
Caller: Rod Burnham    Relationship: Self    Best call back number: [phone]    Requested Prescriptions:   Requested Prescriptions     Pending Prescriptions Disp Refills   • amphetamine-dextroamphetamine XR (Adderall XR) 30 MG 24 hr capsule 30 capsule 0     Sig: Take 1 capsule by mouth Daily Fill when allowed        Pharmacy where request should be sent: [facility] [address] [place] [address] [phone] [facility] [phone] FX         Does the patient have less than a 3 day supply:  [x] Yes  [] No    Isabella Medina, RegSched Rep   02/25/22 11:14 CST

## 2022-02-25 NOTE — TELEPHONE ENCOUNTER
Last filled 1/24/2022      Rx Refill Note  Requested Prescriptions     Pending Prescriptions Disp Refills   • amphetamine-dextroamphetamine XR (Adderall XR) 30 MG 24 hr capsule 30 capsule 0     Sig: Take 1 capsule by mouth Daily Fill when allowed      Last office visit with prescribing clinician: 1/21/2022      Next office visit with prescribing clinician: 7/21/2022            Makenzie Kate MA  02/25/22, 13:41 CST

## 2022-03-11 ENCOUNTER — APPOINTMENT (OUTPATIENT)
Dept: GENERAL RADIOLOGY | Age: 37
End: 2022-03-11
Payer: COMMERCIAL

## 2022-03-11 ENCOUNTER — HOSPITAL ENCOUNTER (EMERGENCY)
Age: 37
Discharge: HOME | End: 2022-03-11
Attending: EMERGENCY MEDICINE
Payer: COMMERCIAL

## 2022-03-11 VITALS
HEIGHT: 70 IN | BODY MASS INDEX: 25.2 KG/M2 | SYSTOLIC BLOOD PRESSURE: 126 MMHG | HEART RATE: 62 BPM | RESPIRATION RATE: 16 BRPM | DIASTOLIC BLOOD PRESSURE: 82 MMHG | TEMPERATURE: 98 F | WEIGHT: 176 LBS | OXYGEN SATURATION: 95 %

## 2022-03-11 DIAGNOSIS — S90.31XA CONTUSION OF RIGHT FOOT, INITIAL ENCOUNTER: Primary | ICD-10-CM

## 2022-03-11 PROCEDURE — 73630 X-RAY EXAM OF FOOT: CPT

## 2022-03-11 PROCEDURE — 6370000000 HC RX 637 (ALT 250 FOR IP): Performed by: EMERGENCY MEDICINE

## 2022-03-11 PROCEDURE — 99285 EMERGENCY DEPT VISIT HI MDM: CPT

## 2022-03-11 RX ORDER — TRAMADOL HYDROCHLORIDE 50 MG/1
100 TABLET ORAL ONCE
Status: COMPLETED | OUTPATIENT
Start: 2022-03-11 | End: 2022-03-11

## 2022-03-11 RX ORDER — ACETAMINOPHEN 325 MG/1
650 TABLET ORAL ONCE
Status: COMPLETED | OUTPATIENT
Start: 2022-03-11 | End: 2022-03-11

## 2022-03-11 RX ORDER — IBUPROFEN 600 MG/1
600 TABLET ORAL 3 TIMES DAILY PRN
Qty: 30 TABLET | Refills: 0 | Status: SHIPPED | OUTPATIENT
Start: 2022-03-11

## 2022-03-11 RX ADMIN — TRAMADOL HYDROCHLORIDE 100 MG: 50 TABLET, COATED ORAL at 22:39

## 2022-03-11 RX ADMIN — IBUPROFEN 600 MG: 200 TABLET, FILM COATED ORAL at 22:11

## 2022-03-11 RX ADMIN — ACETAMINOPHEN 650 MG: 325 TABLET ORAL at 22:11

## 2022-03-11 ASSESSMENT — PAIN DESCRIPTION - ONSET: ONSET: ON-GOING

## 2022-03-11 ASSESSMENT — PAIN DESCRIPTION - DESCRIPTORS: DESCRIPTORS: CONSTANT

## 2022-03-11 ASSESSMENT — PAIN DESCRIPTION - PROGRESSION: CLINICAL_PROGRESSION: GRADUALLY WORSENING

## 2022-03-11 ASSESSMENT — ENCOUNTER SYMPTOMS
SHORTNESS OF BREATH: 0
CONSTIPATION: 0
BACK PAIN: 0
EYE PAIN: 0
VOMITING: 0
SORE THROAT: 0
COUGH: 0
SINUS PAIN: 0
PHOTOPHOBIA: 0
ABDOMINAL PAIN: 0
WHEEZING: 0

## 2022-03-11 ASSESSMENT — PAIN DESCRIPTION - LOCATION: LOCATION: FOOT

## 2022-03-11 ASSESSMENT — PAIN SCALES - GENERAL
PAINLEVEL_OUTOF10: 10
PAINLEVEL_OUTOF10: 10
PAINLEVEL_OUTOF10: 9

## 2022-03-11 ASSESSMENT — PAIN DESCRIPTION - ORIENTATION: ORIENTATION: RIGHT

## 2022-03-11 ASSESSMENT — PAIN DESCRIPTION - PAIN TYPE: TYPE: ACUTE PAIN

## 2022-03-11 ASSESSMENT — PAIN DESCRIPTION - FREQUENCY: FREQUENCY: CONTINUOUS

## 2022-03-12 NOTE — ED NOTES
Pt received 1 pair of crutches, order was placed multiple times because papers were not printing.      Manny Melendez RN  03/11/22 [id number]

## 2022-03-12 NOTE — ED PROVIDER NOTES
[facility]  eMERGENCY dEPARTMENT eNCOUnter      Pt Name: Keith Sandhu  MRN: [id number]  [place] 1985  Date of evaluation: 3/11/2022  Provider: Gisele Cowart MD    [address] [place] [address]       Chief Complaint   Patient presents with    Foot Pain     lifting weights at gym one fell on foot         HISTORY OF PRESENT ILLNESS   (Location/Symptom, Timing/Onset,Context/Setting, Quality, Duration, Modifying Factors, Severity)  Note limiting factors. Keith Sandhu is a 39 y.o. male who presents to the emergency department with right foot pain after he dropped a 5 pound weight onto his foot earlier today. He ambulates with difficulty. No knee pain. He is neurovascularly intact. No headache. No unusual neck or back pain. No chest pain. No cough or shortness of breath. No abdominal pain. No vomiting or diarrhea. No painful urination. No blood thinners. PMH: Right knee meniscus injury. Labrum tear, right shoulder. PSH: Right shoulder surgery. No known drug allergies    Social history: No smoke. Social alcohol. Family history: Noncontributory    HPI    NursingNotes were reviewed. REVIEW OF SYSTEMS    (2-9 systems for level 4, 10 or more for level 5)     Review of Systems   Constitutional: Negative for chills and fever. HENT: Negative for ear pain, sinus pain and sore throat. Eyes: Negative for photophobia and pain. Respiratory: Negative for cough, shortness of breath and wheezing. Cardiovascular: Negative for chest pain. Gastrointestinal: Negative for abdominal pain, constipation and vomiting. Endocrine: Negative for polydipsia. Genitourinary: Negative for dysuria. Musculoskeletal: Positive for joint swelling. Negative for back pain. Skin: Negative for pallor. Allergic/Immunologic: Negative for immunocompromised state. Neurological: Negative for speech difficulty, weakness and headaches. Hematological: Does not bruise/bleed easily.    Psychiatric/Behavioral: Negative for agitation and confusion. All other systems reviewed and are negative. PAST MEDICALHISTORY   History reviewed. No pertinent past medical history. SURGICAL HISTORY       Past Surgical History:   Procedure Laterality Date    SHOULDER SURGERY           CURRENT MEDICATIONS     Previous Medications    AMPHETAMINE-DEXTROAMPHETAMINE (ADDERALL) 30 MG TABLET    Take 30 mg by mouth daily. KETOROLAC (TORADOL) 10 MG TABLET    Take 1 tablet by mouth every 6 hours as needed for Pain    ONDANSETRON (ZOFRAN ODT) 4 MG DISINTEGRATING TABLET    Take 1 tablet by mouth every 8 hours as needed for Nausea or Vomiting    TAMSULOSIN (FLOMAX) 0.4 MG CAPSULE    Take 1 capsule by mouth daily       ALLERGIES     Patient has no known allergies. FAMILY HISTORY       Family History   Problem Relation Age of Onset    Cancer Mother     Cancer Father           SOCIAL HISTORY       Social History     Socioeconomic History    Marital status:      Spouse name: None    Number of children: None    Years of education: None    Highest education level: None   Occupational History    None   Tobacco Use    Smoking status: Never Smoker    Smokeless tobacco: Never Used   Vaping Use    Vaping Use: Never used   Substance and Sexual Activity    Alcohol use: Yes     Comment: occasionally    Drug use: None    Sexual activity: None   Other Topics Concern    None   Social History Narrative    None     Social Determinants of Health     Financial Resource Strain:     Difficulty of Paying Living Expenses: Not on file   Food Insecurity:     Worried About Running Out of Food in the Last Year: Not on file    Vania of Food in the Last Year: Not on file   Transportation Needs:     Lack of Transportation (Medical): Not on file    Lack of Transportation (Non-Medical):  Not on file   Physical Activity:     Days of Exercise per Week: Not on file    Minutes of Exercise per Session: Not on file   Stress:     Feeling of Stress : Not on file   Social Connections:     Frequency of Communication with Friends and Family: Not on file    Frequency of Social Gatherings with Friends and Family: Not on file    Attends Mormonism Services: Not on file    Active Member of Clubs or Organizations: Not on file    Attends Club or Organization Meetings: Not on file    Marital Status: Not on file   Intimate Partner Violence:     Fear of Current or Ex-Partner: Not on file    Emotionally Abused: Not on file    Physically Abused: Not on file    Sexually Abused: Not on file   Housing Stability:     Unable to Pay for Housing in the Last Year: Not on file    Number of [place] in the Last Year: Not on file    Unstable Housing in the Last Year: Not on file       SCREENINGS    [place] Coma Scale  Eye Opening: Spontaneous  Best Verbal Response: Oriented  Best Motor Response: Obeys commands  [place] Coma Scale Score: 15        PHYSICAL EXAM    (up to 7 for level 4, 8 or more for level 5)     ED Triage Vitals [03/11/22 2100]   BP Temp Temp src Pulse Resp SpO2 Height Weight   126/82 98 °F (36.7 °C) -- 62 16 95 % 5' 10\" (1.778 m) 176 lb (79.8 kg)       Physical Exam  Vitals reviewed. Constitutional:       General: He is awake. Appearance: Normal appearance. He is well-developed, well-groomed and underweight. He is not ill-appearing, toxic-appearing or diaphoretic. HENT:      Head:      Comments: Normocephalic/atraumatic. No jaundice. No anisocoria. No septal cellulitis. No stridor or angioedema. No facial injury appreciated  Eyes:      Comments: No anisocoria. No subconjunctival hemorrhage. No septal cellulitis   Neck:      Comments: Midline trachea. No nuchal rigidity. No meningismus. No bony tenderness to palpation  Cardiovascular:      Comments: Regular rate and rhythm without murmur, gallops, or rubs. Pulmonary:      Comments: Clear to auscultation bilaterally. Symmetric breath sounds. Good air movement.   No wheezing, rhonchi, or rales appreciated  Abdominal:      Comments: Soft, nondistended, nontender. No guarding or rebound. Pelvis is stable and nontender. No evidence of an acute surgical abdomen noted at this point in time   Genitourinary:     Comments: Deferred  Musculoskeletal:      Comments: No well localized spinal percussion tenderness. No CVA tenderness. Extremities with normal range of motion. No foot drop. Normal  strength. No compartment syndrome. No acute limb ischemia. No septic joint. Feet:      Comments: Tenderness to palpation to the dorsal aspect of his right foot overlying the tarsal bones. He also has tenderness to the fifth metatarsal base. Normal range of motion. No knee tenderness to palpation. Neurovascularly intact. No abrasions or lacerations identified  Lymphadenopathy:      Comments: Deferred   Skin:     Comments: Warm and dry. No cellulitis appreciated   Neurological:      Mental Status: He is alert. Comments: Cranial nerves II through VII intact. Alert and oriented x4. Nonfocal neurologic examination   Psychiatric:         Behavior: Behavior is cooperative. Comments: Cooperative and appropriate         DIAGNOSTIC RESULTS         XR FOOT RIGHT (MIN 3 VIEWS)   Final Result   1.. No evidence of acute fracture. 2. Linear radiodensity projecting over the base and proximal shaft of   the third metatarsal having the appearance of a needle. Signed by Dr Tobin Morning:  Labs Reviewed - No data to display    All other labs were within normal range or not returned as of this dictation.     EMERGENCY DEPARTMENT COURSE and DIFFERENTIAL DIAGNOSIS/MDM:   Vitals:    Vitals:    03/11/22 2100   BP: 126/82   Pulse: 62   Resp: 16   Temp: 98 °F (36.7 °C)   SpO2: 95%   Weight: 176 lb (79.8 kg)   Height: 5' 10\" (1.778 m)       MDM    Reassessment  The patient was given 600 mg of Motrin, 650 mg of Tylenol, 100 mg of Ultram.  He was placed on crutches with weightbearing as tolerated. PMD follow-up this week. Return precautions. Will be discharged on Motrin and Tylenol for pain control    CONSULTS:  None    PROCEDURES:  Unless otherwise noted below, none     Procedures    FINAL IMPRESSION      1.  Contusion of right foot, initial encounter          DISPOSITION/PLAN   DISPOSITION Decision To Discharge 03/11/2022 10:24:13 PM      PATIENT REFERRED TO:  Scott Nolasco MD  [address] [id number]  [phone]    Schedule an appointment as soon as possible for a visit in 3 days  PMD next week for recheck      DISCHARGE MEDICATIONS:  New Prescriptions    IBUPROFEN (ADVIL;MOTRIN) 600 MG TABLET    Take 1 tablet by mouth 3 times daily as needed for Pain          (Please note that portions of this note were completed with a voice recognition program.  Efforts were made to edit thedictations but occasionally words are mis-transcribed.)    Ryanne Ryan MD (electronically signed)  Attending Emergency Physician          Ryanne Ryan MD  03/11/22 [id number]

## 2022-03-25 DIAGNOSIS — F90.2 ATTENTION DEFICIT HYPERACTIVITY DISORDER (ADHD), COMBINED TYPE: ICD-10-CM

## 2022-03-25 RX ORDER — DEXTROAMPHETAMINE SACCHARATE, AMPHETAMINE ASPARTATE MONOHYDRATE, DEXTROAMPHETAMINE SULFATE AND AMPHETAMINE SULFATE 7.5; 7.5; 7.5; 7.5 MG/1; MG/1; MG/1; MG/1
30 CAPSULE, EXTENDED RELEASE ORAL DAILY
Qty: 30 CAPSULE | Refills: 0 | Status: SHIPPED | OUTPATIENT
Start: 2022-03-25 | End: 2022-05-04 | Stop reason: SDUPTHER

## 2022-03-25 RX ORDER — DEXTROAMPHETAMINE SACCHARATE, AMPHETAMINE ASPARTATE, DEXTROAMPHETAMINE SULFATE AND AMPHETAMINE SULFATE 2.5; 2.5; 2.5; 2.5 MG/1; MG/1; MG/1; MG/1
10 TABLET ORAL DAILY
Qty: 30 TABLET | Refills: 0 | Status: SHIPPED | OUTPATIENT
Start: 2022-03-25 | End: 2022-05-04 | Stop reason: SDUPTHER

## 2022-03-25 NOTE — TELEPHONE ENCOUNTER
Caller: Rod Burnham    Relationship: Self    Best call back number: [phone]    Requested Prescriptions:   Requested Prescriptions     Pending Prescriptions Disp Refills   • amphetamine-dextroamphetamine XR (Adderall XR) 30 MG 24 hr capsule 30 capsule 0     Sig: Take 1 capsule by mouth Daily Fill when allowed   • amphetamine-dextroamphetamine (ADDERALL) 10 MG tablet 30 tablet 0     Sig: Take 1 tablet by mouth Daily. Fill when allowed        Pharmacy where request should be sent: [address] [phone] [facility] [phone] FX       Does the patient have less than a 3 day supply:  [x] Yes  [] No    Odell Carter Rep   03/25/22 09:50 CDT

## 2022-03-25 NOTE — TELEPHONE ENCOUNTER
Rx Refill Note  Requested Prescriptions     Pending Prescriptions Disp Refills   • amphetamine-dextroamphetamine XR (Adderall XR) 30 MG 24 hr capsule 30 capsule 0     Sig: Take 1 capsule by mouth Daily Fill when allowed   • amphetamine-dextroamphetamine (ADDERALL) 10 MG tablet 30 tablet 0     Sig: Take 1 tablet by mouth Daily. Fill when allowed      Last office visit with prescribing clinician: 1/21/2022      Next office visit with prescribing clinician: 7/21/2022            JASEN Dykes  03/25/22, 09:54 CDT

## 2022-05-04 DIAGNOSIS — F90.2 ATTENTION DEFICIT HYPERACTIVITY DISORDER (ADHD), COMBINED TYPE: ICD-10-CM

## 2022-05-04 RX ORDER — DEXTROAMPHETAMINE SACCHARATE, AMPHETAMINE ASPARTATE MONOHYDRATE, DEXTROAMPHETAMINE SULFATE AND AMPHETAMINE SULFATE 7.5; 7.5; 7.5; 7.5 MG/1; MG/1; MG/1; MG/1
30 CAPSULE, EXTENDED RELEASE ORAL DAILY
Qty: 30 CAPSULE | Refills: 0 | Status: SHIPPED | OUTPATIENT
Start: 2022-05-04 | End: 2022-06-13 | Stop reason: SDUPTHER

## 2022-05-04 RX ORDER — DEXTROAMPHETAMINE SACCHARATE, AMPHETAMINE ASPARTATE, DEXTROAMPHETAMINE SULFATE AND AMPHETAMINE SULFATE 2.5; 2.5; 2.5; 2.5 MG/1; MG/1; MG/1; MG/1
10 TABLET ORAL DAILY
Qty: 30 TABLET | Refills: 0 | Status: SHIPPED | OUTPATIENT
Start: 2022-05-04 | End: 2022-06-13 | Stop reason: SDUPTHER

## 2022-05-04 NOTE — TELEPHONE ENCOUNTER
Caller: Rod Burnham    Relationship: Self    Best call back number:[phone]    Requested Prescriptions:   Requested Prescriptions     Pending Prescriptions Disp Refills   • amphetamine-dextroamphetamine (ADDERALL) 10 MG tablet 30 tablet 0     Sig: Take 1 tablet by mouth Daily. Fill when allowed   • amphetamine-dextroamphetamine XR (Adderall XR) 30 MG 24 hr capsule 30 capsule 0     Sig: Take 1 capsule by mouth Daily Fill when allowed        Pharmacy where request should be sent: [address] [phone] [facility] [phone] FX         Does the patient have less than a 3 day supply:  [x] Yes  [] No    Odell CAI Rep   05/04/22 15:21 CDT

## 2022-06-13 DIAGNOSIS — F90.2 ATTENTION DEFICIT HYPERACTIVITY DISORDER (ADHD), COMBINED TYPE: ICD-10-CM

## 2022-06-13 RX ORDER — DEXTROAMPHETAMINE SACCHARATE, AMPHETAMINE ASPARTATE MONOHYDRATE, DEXTROAMPHETAMINE SULFATE AND AMPHETAMINE SULFATE 7.5; 7.5; 7.5; 7.5 MG/1; MG/1; MG/1; MG/1
30 CAPSULE, EXTENDED RELEASE ORAL DAILY
Qty: 30 CAPSULE | Refills: 0 | Status: SHIPPED | OUTPATIENT
Start: 2022-06-13 | End: 2022-07-21 | Stop reason: SDUPTHER

## 2022-06-13 RX ORDER — DEXTROAMPHETAMINE SACCHARATE, AMPHETAMINE ASPARTATE, DEXTROAMPHETAMINE SULFATE AND AMPHETAMINE SULFATE 2.5; 2.5; 2.5; 2.5 MG/1; MG/1; MG/1; MG/1
10 TABLET ORAL DAILY
Qty: 30 TABLET | Refills: 0 | Status: SHIPPED | OUTPATIENT
Start: 2022-06-13 | End: 2022-07-21 | Stop reason: SDUPTHER

## 2022-06-13 NOTE — TELEPHONE ENCOUNTER
Caller: Rod Burnham    Relationship: Self    Best call back number: [phone]    Requested Prescriptions: amphetamine-dextroamphetamine (ADDERALL) 10 MG tablet AND    amphetamine-dextroamphetamine XR (Adderall XR) 30 MG 24 hr capsule       Requested Prescriptions      No prescriptions requested or ordered in this encounter        Pharmacy where request should be sent:      [facility] [place] [address] [facility] [phone]  - [phone]   [phone]    Additional details provided by patient: LESS THEN 3 DAYS    Does the patient have less than a 3 day supply:  [x] Yes  [] No    Odell Barber Rep   06/13/22 10:50 CDT

## 2022-06-28 ENCOUNTER — TELEPHONE (OUTPATIENT)
Dept: FAMILY MEDICINE CLINIC | Facility: CLINIC | Age: 37
End: 2022-06-28

## 2022-06-28 RX ORDER — CIPROFLOXACIN HYDROCHLORIDE 3.5 MG/ML
1 SOLUTION/ DROPS TOPICAL 4 TIMES DAILY
Qty: 2.5 ML | Refills: 0 | Status: SHIPPED | OUTPATIENT
Start: 2022-06-28 | End: 2022-07-21

## 2022-06-28 NOTE — TELEPHONE ENCOUNTER
Ok for ciloxin drops - sent in - f/u PRN if no better    Electronically signed by Gutierrez Miller, PUMA, 06/28/22, 2:10 PM CDT.

## 2022-06-28 NOTE — TELEPHONE ENCOUNTER
Caller: Rod Burnham    Relationship: Self    Best call back number: [phone]    What medication are you requesting: ANTIBIOTICS FOR PINK EYE    What are your current symptoms: RED WATERY EYES AND GUNKED OVER THIS MORNING     How long have you been experiencing symptoms: STARTED THIS MORNING     Have you had these symptoms before:    [x] Yes  [] No    Have you been treated for these symptoms before:   [x] Yes  [] No    If a prescription is needed, what is your preferred pharmacy and phone number[place] [facility] [place] [address] [facility] [phone] [facility] [phone]      Additional notes:    PLEASE CALL IF NEEDS TO BE SEEN

## 2022-07-21 ENCOUNTER — OFFICE VISIT (OUTPATIENT)
Dept: FAMILY MEDICINE CLINIC | Facility: CLINIC | Age: 37
End: 2022-07-21

## 2022-07-21 VITALS
HEIGHT: 70 IN | HEART RATE: 80 BPM | TEMPERATURE: 98.8 F | WEIGHT: 170 LBS | BODY MASS INDEX: 24.34 KG/M2 | RESPIRATION RATE: 16 BRPM | DIASTOLIC BLOOD PRESSURE: 74 MMHG | SYSTOLIC BLOOD PRESSURE: 122 MMHG

## 2022-07-21 DIAGNOSIS — F51.01 PRIMARY INSOMNIA: ICD-10-CM

## 2022-07-21 DIAGNOSIS — F90.2 ATTENTION DEFICIT HYPERACTIVITY DISORDER (ADHD), COMBINED TYPE: ICD-10-CM

## 2022-07-21 DIAGNOSIS — R79.9 ELEVATED BUN: Primary | ICD-10-CM

## 2022-07-21 DIAGNOSIS — Z00.00 WELLNESS EXAMINATION: ICD-10-CM

## 2022-07-21 PROCEDURE — 99214 OFFICE O/P EST MOD 30 MIN: CPT | Performed by: NURSE PRACTITIONER

## 2022-07-21 RX ORDER — DEXTROAMPHETAMINE SACCHARATE, AMPHETAMINE ASPARTATE, DEXTROAMPHETAMINE SULFATE AND AMPHETAMINE SULFATE 2.5; 2.5; 2.5; 2.5 MG/1; MG/1; MG/1; MG/1
10 TABLET ORAL DAILY
Qty: 30 TABLET | Refills: 0 | Status: SHIPPED | OUTPATIENT
Start: 2022-07-21 | End: 2022-09-01 | Stop reason: SDUPTHER

## 2022-07-21 RX ORDER — DEXTROAMPHETAMINE SACCHARATE, AMPHETAMINE ASPARTATE MONOHYDRATE, DEXTROAMPHETAMINE SULFATE AND AMPHETAMINE SULFATE 7.5; 7.5; 7.5; 7.5 MG/1; MG/1; MG/1; MG/1
30 CAPSULE, EXTENDED RELEASE ORAL DAILY
Qty: 30 CAPSULE | Refills: 0 | Status: SHIPPED | OUTPATIENT
Start: 2022-07-21 | End: 2022-09-01 | Stop reason: SDUPTHER

## 2022-07-21 RX ORDER — TRAZODONE HYDROCHLORIDE 50 MG/1
50 TABLET ORAL NIGHTLY PRN
Qty: 30 TABLET | Refills: 5 | Status: SHIPPED | OUTPATIENT
Start: 2022-07-21 | End: 2022-12-20 | Stop reason: SDUPTHER

## 2022-07-21 NOTE — PROGRESS NOTES
Subjective   Chief Complaint:  Regular medication checkup    History of Present Illness:  This 37 y.o. male was seen in the office today.  Presents today for his regular medication checkup.    ADHD: Combined hyperactive and inattentive-reports doing well on current dose of Adderall-denies any ill effects.    Insomnia: Reports doing well on trazodone.    Elevated BUN: Incidental finding on last labs-advised hydration, no NSAIDs-he is agreeable to recheck today.    No Known Allergies   Current Outpatient Medications on File Prior to Visit   Medication Sig   • SUMAtriptan (IMITREX) 50 MG tablet Take 50 mg by mouth Daily As Needed for Headache. May repeat in 2 hours if needed.   • [DISCONTINUED] amphetamine-dextroamphetamine (ADDERALL) 10 MG tablet Take 1 tablet by mouth Daily. Fill when allowed   • [DISCONTINUED] amphetamine-dextroamphetamine XR (Adderall XR) 30 MG 24 hr capsule Take 1 capsule by mouth Daily Fill when allowed   • [DISCONTINUED] traZODone (DESYREL) 50 MG tablet Take 1 tablet by mouth At Night As Needed for Sleep.   • [DISCONTINUED] ciprofloxacin (Ciloxan) 0.3 % ophthalmic solution Administer 1 drop to both eyes 4 (Four) Times a Day.     No current facility-administered medications on file prior to visit.      Past Medical, Surgical, Social, and Family History:  Past Medical History:   Diagnosis Date   • ADHD (attention deficit hyperactivity disorder)      Past Surgical History:   Procedure Laterality Date   • SHOULDER SURGERY Right      Social History     Socioeconomic History   • Marital status:    Tobacco Use   • Smoking status: Never Smoker   • Smokeless tobacco: Never Used   Substance and Sexual Activity   • Alcohol use: Never   • Drug use: Never   • Sexual activity: Defer     Family History   Problem Relation Age of Onset   • Breast cancer Mother 53   • Melanoma Father    • Breast cancer Maternal Grandmother      Objective   Physical Exam  Constitutional:       General: He is not in acute  "distress.  Neck:      Comments: No enlarged thyroid-no palpable nodule or mass.  Cardiovascular:      Rate and Rhythm: Normal rate and regular rhythm.      Pulses: Normal pulses.      Heart sounds: No murmur heard.    No friction rub. No gallop.   Pulmonary:      Effort: Pulmonary effort is normal. No respiratory distress.      Breath sounds: Normal breath sounds. No wheezing or rhonchi.   Lymphadenopathy:      Cervical: No cervical adenopathy.   Neurological:      Mental Status: He is alert.     /74   Pulse 80   Temp 98.8 °F (37.1 °C)   Resp 16   Ht 177.8 cm (70\")   Wt 77.1 kg (170 lb)   BMI 24.39 kg/m²     Assessment & Plan   Diagnoses and all orders for this visit:    1. Elevated BUN (Primary)  -     UA / M With / Rflx Culture([facility] ONLY) - Urine, Clean Catch  -     Basic metabolic panel    2. Attention deficit hyperactivity disorder (ADHD), combined type  -     amphetamine-dextroamphetamine (ADDERALL) 10 MG tablet; Take 1 tablet by mouth Daily.  Dispense: 30 tablet; Refill: 0  -     amphetamine-dextroamphetamine XR (Adderall XR) 30 MG 24 hr capsule; Take 1 capsule by mouth Daily  Dispense: 30 capsule; Refill: 0    3. Primary insomnia  -     traZODone (DESYREL) 50 MG tablet; Take 1 tablet by mouth At Night As Needed for Sleep.  Dispense: 30 tablet; Refill: 5    4. Wellness examination  -     CBC & Differential; Future  -     Comprehensive Metabolic Panel; Future  -     TSH; Future  -     Lipid Panel; Future    Discussion:  Advised and educated plan of care.  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  We will recheck a renals this visit-ordered future labs for the yearly comprehensive in 6 months.  Refill sent.    BMI is within normal parameters. No other follow-up for BMI required.    Follow-up:  Return in about 6 months (around 1/21/2023) for Follow-Up with Separate Fasting Lab Visit Prior.    Electronically signed by PUMA Pantoja, 07/21/22, " 10:42 AM CDT.

## 2022-09-01 DIAGNOSIS — F90.2 ATTENTION DEFICIT HYPERACTIVITY DISORDER (ADHD), COMBINED TYPE: ICD-10-CM

## 2022-09-01 RX ORDER — DEXTROAMPHETAMINE SACCHARATE, AMPHETAMINE ASPARTATE MONOHYDRATE, DEXTROAMPHETAMINE SULFATE AND AMPHETAMINE SULFATE 7.5; 7.5; 7.5; 7.5 MG/1; MG/1; MG/1; MG/1
30 CAPSULE, EXTENDED RELEASE ORAL DAILY
Qty: 30 CAPSULE | Refills: 0 | Status: SHIPPED | OUTPATIENT
Start: 2022-09-01 | End: 2022-10-06 | Stop reason: SDUPTHER

## 2022-09-01 RX ORDER — DEXTROAMPHETAMINE SACCHARATE, AMPHETAMINE ASPARTATE, DEXTROAMPHETAMINE SULFATE AND AMPHETAMINE SULFATE 2.5; 2.5; 2.5; 2.5 MG/1; MG/1; MG/1; MG/1
10 TABLET ORAL DAILY
Qty: 30 TABLET | Refills: 0 | Status: SHIPPED | OUTPATIENT
Start: 2022-09-01 | End: 2022-10-06 | Stop reason: SDUPTHER

## 2022-09-01 NOTE — TELEPHONE ENCOUNTER
Rx Refill Note  Requested Prescriptions     Pending Prescriptions Disp Refills   • amphetamine-dextroamphetamine XR (Adderall XR) 30 MG 24 hr capsule 30 capsule 0     Sig: Take 1 capsule by mouth Daily   • amphetamine-dextroamphetamine (ADDERALL) 10 MG tablet 30 tablet 0     Sig: Take 1 tablet by mouth Daily.      Last office visit with prescribing clinician: 7/21/2022      Next office visit with prescribing clinician: 1/23/2023            Janet Acosta MA  09/01/22, 16:39 CDT

## 2022-09-01 NOTE — TELEPHONE ENCOUNTER
Caller: Rod Burnham    Relationship: Self    Best call back number: [phone]    Requested Prescriptions: amphetamine-dextroamphetamine (ADDERALL) 10 MG tablet AND amphetamine-dextroamphetamine XR (Adderall XR) 30 MG 24 hr capsule  Requested Prescriptions      No prescriptions requested or ordered in this encounter        Pharmacy where request should be sent:      [facility] [place] [address] [place] [address] [phone]  - [phone]   [phone]    Additional details provided by patient: LESS THEN 3 DAYS    Does the patient have less than a 3 day supply:  [x] Yes  [] No    Odell Barber Rep   09/01/22 11:07 CDT

## 2022-09-09 ENCOUNTER — LAB (OUTPATIENT)
Dept: FAMILY MEDICINE CLINIC | Facility: CLINIC | Age: 37
End: 2022-09-09

## 2022-09-10 LAB
ALBUMIN SERPL-MCNC: 4.5 G/DL (ref 3.5–5.2)
ALBUMIN/GLOB SERPL: 1.9 G/DL
ALP SERPL-CCNC: 57 U/L (ref 39–117)
ALT SERPL-CCNC: 33 U/L (ref 1–41)
APPEARANCE UR: CLEAR
AST SERPL-CCNC: 30 U/L (ref 1–40)
BACTERIA #/AREA URNS HPF: NORMAL /HPF
BASOPHILS # BLD AUTO: 0.05 10*3/MM3 (ref 0–0.2)
BASOPHILS NFR BLD AUTO: 0.6 % (ref 0–1.5)
BILIRUB SERPL-MCNC: 0.7 MG/DL (ref 0–1.2)
BILIRUB UR QL STRIP: NEGATIVE
BUN SERPL-MCNC: 20 MG/DL (ref 6–20)
BUN/CREAT SERPL: 19.8 (ref 7–25)
CALCIUM SERPL-MCNC: 9.9 MG/DL (ref 8.6–10.5)
CASTS URNS QL MICRO: NORMAL /LPF
CHLORIDE SERPL-SCNC: 103 MMOL/L (ref 98–107)
CHOLEST SERPL-MCNC: 140 MG/DL (ref 0–200)
CO2 SERPL-SCNC: 27.7 MMOL/L (ref 22–29)
COLOR UR: YELLOW
CREAT SERPL-MCNC: 1.01 MG/DL (ref 0.76–1.27)
EGFRCR-CYS SERPLBLD CKD-EPI 2021: 98.2 ML/MIN/1.73
EOSINOPHIL # BLD AUTO: 0.25 10*3/MM3 (ref 0–0.4)
EOSINOPHIL NFR BLD AUTO: 3.2 % (ref 0.3–6.2)
EPI CELLS #/AREA URNS HPF: NORMAL /HPF (ref 0–10)
ERYTHROCYTE [DISTWIDTH] IN BLOOD BY AUTOMATED COUNT: 12.8 % (ref 12.3–15.4)
GLOBULIN SER CALC-MCNC: 2.4 GM/DL
GLUCOSE SERPL-MCNC: 99 MG/DL (ref 65–99)
GLUCOSE UR QL STRIP: NEGATIVE
HCT VFR BLD AUTO: 43.1 % (ref 37.5–51)
HDLC SERPL-MCNC: 57 MG/DL (ref 40–60)
HGB BLD-MCNC: 15.1 G/DL (ref 13–17.7)
HGB UR QL STRIP: NEGATIVE
IMM GRANULOCYTES # BLD AUTO: 0.02 10*3/MM3 (ref 0–0.05)
IMM GRANULOCYTES NFR BLD AUTO: 0.3 % (ref 0–0.5)
KETONES UR QL STRIP: NEGATIVE
LDLC SERPL CALC-MCNC: 69 MG/DL (ref 0–100)
LEUKOCYTE ESTERASE UR QL STRIP: NEGATIVE
LYMPHOCYTES # BLD AUTO: 1.96 10*3/MM3 (ref 0.7–3.1)
LYMPHOCYTES NFR BLD AUTO: 25.2 % (ref 19.6–45.3)
MCH RBC QN AUTO: 31.2 PG (ref 26.6–33)
MCHC RBC AUTO-ENTMCNC: 35 G/DL (ref 31.5–35.7)
MCV RBC AUTO: 89 FL (ref 79–97)
MICRO URNS: NORMAL
MICRO URNS: NORMAL
MONOCYTES # BLD AUTO: 0.79 10*3/MM3 (ref 0.1–0.9)
MONOCYTES NFR BLD AUTO: 10.2 % (ref 5–12)
NEUTROPHILS # BLD AUTO: 4.71 10*3/MM3 (ref 1.7–7)
NEUTROPHILS NFR BLD AUTO: 60.5 % (ref 42.7–76)
NITRITE UR QL STRIP: NEGATIVE
NRBC BLD AUTO-RTO: 0 /100 WBC (ref 0–0.2)
PH UR STRIP: 6 [PH] (ref 5–7.5)
PLATELET # BLD AUTO: 270 10*3/MM3 (ref 140–450)
POTASSIUM SERPL-SCNC: 4.4 MMOL/L (ref 3.5–5.2)
PROT SERPL-MCNC: 6.9 G/DL (ref 6–8.5)
PROT UR QL STRIP: NEGATIVE
RBC # BLD AUTO: 4.84 10*6/MM3 (ref 4.14–5.8)
RBC #/AREA URNS HPF: NORMAL /HPF (ref 0–2)
SODIUM SERPL-SCNC: 139 MMOL/L (ref 136–145)
SP GR UR STRIP: 1.03 (ref 1–1.03)
TRIGL SERPL-MCNC: 67 MG/DL (ref 0–150)
TSH SERPL DL<=0.005 MIU/L-ACNC: 1.27 UIU/ML (ref 0.27–4.2)
URINALYSIS REFLEX: NORMAL
UROBILINOGEN UR STRIP-MCNC: 0.2 MG/DL (ref 0.2–1)
VLDLC SERPL CALC-MCNC: 14 MG/DL (ref 5–40)
WBC # BLD AUTO: 7.78 10*3/MM3 (ref 3.4–10.8)
WBC #/AREA URNS HPF: NORMAL /HPF (ref 0–5)

## 2022-09-12 NOTE — PROGRESS NOTES
Please call the patient - labs are all stable.    Electronically signed by PUMA Pantoja, 09/11/22, 8:18 PM CDT.

## 2022-10-06 DIAGNOSIS — F90.2 ATTENTION DEFICIT HYPERACTIVITY DISORDER (ADHD), COMBINED TYPE: ICD-10-CM

## 2022-10-06 NOTE — TELEPHONE ENCOUNTER
Caller: Rod Burnham    Relationship: Self    Best call back number: [phone]    Requested Prescriptions:   Requested Prescriptions     Pending Prescriptions Disp Refills   • amphetamine-dextroamphetamine XR (Adderall XR) 30 MG 24 hr capsule 30 capsule 0     Sig: Take 1 capsule by mouth Daily   • amphetamine-dextroamphetamine (ADDERALL) 10 MG tablet 30 tablet 0     Sig: Take 1 tablet by mouth Daily.        Pharmacy where request should be sent: [address] [phone] [place] [phone] FX     Does the patient have less than a 3 day supply:  [x] Yes  [] No    Odell Roberts Rep   10/06/22 14:39 CDT

## 2022-10-10 ENCOUNTER — TELEPHONE (OUTPATIENT)
Dept: FAMILY MEDICINE CLINIC | Facility: CLINIC | Age: 37
End: 2022-10-10

## 2022-10-10 NOTE — TELEPHONE ENCOUNTER
Caller: Rod Burnham    Relationship: Self    Best call back number: [phone]    What is the best time to reach you: SOON PLEASE       Who are you requesting to speak with (clinical staff, provider,  specific staff member): PROVIDER OR CLINICAL STAFF       What was the call regarding: PATIENT REQUESTING A CALL BACK TO CHECK ON THE STATUS OF THE MEDICATION REFILLS HE REQUESTED ON 10/6/22     Do you require a callback: YES

## 2022-10-11 RX ORDER — DEXTROAMPHETAMINE SACCHARATE, AMPHETAMINE ASPARTATE, DEXTROAMPHETAMINE SULFATE AND AMPHETAMINE SULFATE 2.5; 2.5; 2.5; 2.5 MG/1; MG/1; MG/1; MG/1
10 TABLET ORAL DAILY
Qty: 30 TABLET | Refills: 0 | Status: SHIPPED | OUTPATIENT
Start: 2022-10-11 | End: 2022-11-15 | Stop reason: SDUPTHER

## 2022-10-11 RX ORDER — DEXTROAMPHETAMINE SACCHARATE, AMPHETAMINE ASPARTATE MONOHYDRATE, DEXTROAMPHETAMINE SULFATE AND AMPHETAMINE SULFATE 7.5; 7.5; 7.5; 7.5 MG/1; MG/1; MG/1; MG/1
30 CAPSULE, EXTENDED RELEASE ORAL DAILY
Qty: 30 CAPSULE | Refills: 0 | Status: SHIPPED | OUTPATIENT
Start: 2022-10-11 | End: 2022-11-15 | Stop reason: SDUPTHER

## 2022-10-11 NOTE — TELEPHONE ENCOUNTER
ROZ KUMAR WNL      Rx Refill Note  Requested Prescriptions     Pending Prescriptions Disp Refills   • amphetamine-dextroamphetamine XR (Adderall XR) 30 MG 24 hr capsule 30 capsule 0     Sig: Take 1 capsule by mouth Daily   • amphetamine-dextroamphetamine (ADDERALL) 10 MG tablet 30 tablet 0     Sig: Take 1 tablet by mouth Daily.      Last office visit with prescribing clinician: 7/21/2022      Next office visit with prescribing clinician: 10/10/2022            Janet Acosta MA  10/11/22, 15:30 CDT

## 2022-11-15 DIAGNOSIS — F90.2 ATTENTION DEFICIT HYPERACTIVITY DISORDER (ADHD), COMBINED TYPE: ICD-10-CM

## 2022-11-15 NOTE — TELEPHONE ENCOUNTER
Caller: Rod Burnham    Relationship: Self    Best call back number:[phone]    Requested Prescriptions:   Requested Prescriptions     Pending Prescriptions Disp Refills   • amphetamine-dextroamphetamine (ADDERALL) 10 MG tablet 30 tablet 0     Sig: Take 1 tablet by mouth Daily.   • amphetamine-dextroamphetamine XR (Adderall XR) 30 MG 24 hr capsule 30 capsule 0     Sig: Take 1 capsule by mouth Daily        Pharmacy where request should be sent: [address] [phone] [facility] [phone] FX     Does the patient have less than a 3 day supply:  [x] Yes  [] No    Odell Roberts Rep   11/15/22 09:45 CST

## 2022-11-18 ENCOUNTER — TELEPHONE (OUTPATIENT)
Dept: FAMILY MEDICINE CLINIC | Facility: CLINIC | Age: 37
End: 2022-11-18

## 2022-11-18 RX ORDER — DEXTROAMPHETAMINE SACCHARATE, AMPHETAMINE ASPARTATE MONOHYDRATE, DEXTROAMPHETAMINE SULFATE AND AMPHETAMINE SULFATE 7.5; 7.5; 7.5; 7.5 MG/1; MG/1; MG/1; MG/1
30 CAPSULE, EXTENDED RELEASE ORAL DAILY
Qty: 30 CAPSULE | Refills: 0 | Status: SHIPPED | OUTPATIENT
Start: 2022-11-18 | End: 2022-12-20 | Stop reason: SDUPTHER

## 2022-11-18 RX ORDER — DEXTROAMPHETAMINE SACCHARATE, AMPHETAMINE ASPARTATE, DEXTROAMPHETAMINE SULFATE AND AMPHETAMINE SULFATE 2.5; 2.5; 2.5; 2.5 MG/1; MG/1; MG/1; MG/1
10 TABLET ORAL DAILY
Qty: 30 TABLET | Refills: 0 | Status: SHIPPED | OUTPATIENT
Start: 2022-11-18 | End: 2022-12-20 | Stop reason: SDUPTHER

## 2022-11-18 NOTE — TELEPHONE ENCOUNTER
ROZ KUMAR  WNL    Rx Refill Note  Requested Prescriptions     Pending Prescriptions Disp Refills   • amphetamine-dextroamphetamine (ADDERALL) 10 MG tablet 30 tablet 0     Sig: Take 1 tablet by mouth Daily.   • amphetamine-dextroamphetamine XR (Adderall XR) 30 MG 24 hr capsule 30 capsule 0     Sig: Take 1 capsule by mouth Daily      Last office visit with prescribing clinician: 7/21/2022      Next office visit with prescribing clinician: 11/18/2022            Janet Acosta MA  11/18/22, 16:18 CST

## 2022-12-20 DIAGNOSIS — F51.01 PRIMARY INSOMNIA: ICD-10-CM

## 2022-12-20 DIAGNOSIS — F90.2 ATTENTION DEFICIT HYPERACTIVITY DISORDER (ADHD), COMBINED TYPE: ICD-10-CM

## 2022-12-20 RX ORDER — TRAZODONE HYDROCHLORIDE 50 MG/1
50 TABLET ORAL NIGHTLY PRN
Qty: 30 TABLET | Refills: 5 | Status: SHIPPED | OUTPATIENT
Start: 2022-12-20 | End: 2023-03-24 | Stop reason: SDUPTHER

## 2022-12-20 RX ORDER — DEXTROAMPHETAMINE SACCHARATE, AMPHETAMINE ASPARTATE, DEXTROAMPHETAMINE SULFATE AND AMPHETAMINE SULFATE 2.5; 2.5; 2.5; 2.5 MG/1; MG/1; MG/1; MG/1
10 TABLET ORAL DAILY
Qty: 30 TABLET | Refills: 0 | Status: SHIPPED | OUTPATIENT
Start: 2022-12-20 | End: 2023-01-20 | Stop reason: SDUPTHER

## 2022-12-20 RX ORDER — DEXTROAMPHETAMINE SACCHARATE, AMPHETAMINE ASPARTATE MONOHYDRATE, DEXTROAMPHETAMINE SULFATE AND AMPHETAMINE SULFATE 7.5; 7.5; 7.5; 7.5 MG/1; MG/1; MG/1; MG/1
30 CAPSULE, EXTENDED RELEASE ORAL DAILY
Qty: 30 CAPSULE | Refills: 0 | Status: SHIPPED | OUTPATIENT
Start: 2022-12-20 | End: 2023-01-20 | Stop reason: SDUPTHER

## 2022-12-20 NOTE — TELEPHONE ENCOUNTER
Caller: Rod Burnham    Relationship: Self    Best call back number: [phone]    Requested Prescriptions:   Requested Prescriptions     Pending Prescriptions Disp Refills   • amphetamine-dextroamphetamine XR (Adderall XR) 30 MG 24 hr capsule 30 capsule 0     Sig: Take 1 capsule by mouth Daily   • amphetamine-dextroamphetamine (ADDERALL) 10 MG tablet 30 tablet 0     Sig: Take 1 tablet by mouth Daily.   • traZODone (DESYREL) 50 MG tablet 30 tablet 5     Sig: Take 1 tablet by mouth At Night As Needed for Sleep.        Pharmacy where request should be sent: [facility] [place] [address] [facility] [phone] [facility] [phone] FX         Does the patient have less than a 3 day supply:  [x] Yes  [] No    Would you like a call back once the refill request has been completed: [x] Yes [] No    If the office needs to give you a call back, can they leave a voicemail: [x] Yes [] No    Odell Johnson Rep   12/20/22 15:34 CST

## 2022-12-20 NOTE — TELEPHONE ENCOUNTER
[facility] WNL  Per protocol last refill 11/18/2022      Rx Refill Note  Requested Prescriptions     Pending Prescriptions Disp Refills   • amphetamine-dextroamphetamine XR (Adderall XR) 30 MG 24 hr capsule 30 capsule 0     Sig: Take 1 capsule by mouth Daily   • amphetamine-dextroamphetamine (ADDERALL) 10 MG tablet 30 tablet 0     Sig: Take 1 tablet by mouth Daily.   • traZODone (DESYREL) 50 MG tablet 30 tablet 5     Sig: Take 1 tablet by mouth At Night As Needed for Sleep.      Last office visit with prescribing clinician: 7/21/2022      Next office visit with prescribing clinician: 1/23/2023            Vinnie Recinos MA  12/20/22, 16:15 CST

## 2023-01-20 DIAGNOSIS — F90.2 ATTENTION DEFICIT HYPERACTIVITY DISORDER (ADHD), COMBINED TYPE: ICD-10-CM

## 2023-01-20 RX ORDER — DEXTROAMPHETAMINE SACCHARATE, AMPHETAMINE ASPARTATE MONOHYDRATE, DEXTROAMPHETAMINE SULFATE AND AMPHETAMINE SULFATE 7.5; 7.5; 7.5; 7.5 MG/1; MG/1; MG/1; MG/1
30 CAPSULE, EXTENDED RELEASE ORAL DAILY
Qty: 30 CAPSULE | Refills: 0 | Status: SHIPPED | OUTPATIENT
Start: 2023-01-20 | End: 2023-02-20 | Stop reason: SDUPTHER

## 2023-01-20 RX ORDER — DEXTROAMPHETAMINE SACCHARATE, AMPHETAMINE ASPARTATE, DEXTROAMPHETAMINE SULFATE AND AMPHETAMINE SULFATE 2.5; 2.5; 2.5; 2.5 MG/1; MG/1; MG/1; MG/1
10 TABLET ORAL DAILY
Qty: 30 TABLET | Refills: 0 | Status: SHIPPED | OUTPATIENT
Start: 2023-01-20 | End: 2023-02-20 | Stop reason: SDUPTHER

## 2023-01-20 NOTE — TELEPHONE ENCOUNTER
[facility] WNL  Per Protocol Last Refill 12/21/2022    Rx Refill Note  Requested Prescriptions     Pending Prescriptions Disp Refills   • amphetamine-dextroamphetamine XR (Adderall XR) 30 MG 24 hr capsule 30 capsule 0     Sig: Take 1 capsule by mouth Daily   • amphetamine-dextroamphetamine (ADDERALL) 10 MG tablet 30 tablet 0     Sig: Take 1 tablet by mouth Daily.      Last office visit with prescribing clinician: 7/21/2022      Next office visit with prescribing clinician: 1/23/2023            Vinnie Recinos MA  01/20/23, 11:41 CST

## 2023-01-20 NOTE — TELEPHONE ENCOUNTER
Caller: Rod Burnham    Relationship: Self    Best call back number:  [phone]    Requested Prescriptions     Pending Prescriptions Disp Refills   • amphetamine-dextroamphetamine XR (Adderall XR) 30 MG 24 hr capsule 30 capsule 0     Sig: Take 1 capsule by mouth Daily   • amphetamine-dextroamphetamine (ADDERALL) 10 MG tablet 30 tablet 0     Sig: Take 1 tablet by mouth Daily.        Pharmacy where request should be sent: [address] [phone] [facility] [phone] FX       Does the patient have less than a 3 day supply:  [x] Yes  [] No    Would you like a call back once the refill request has been completed: [x] Yes [] No    If the office needs to give you a call back, can they leave a voicemail: [x] Yes [] No    Odell Shea Rep   01/20/23 10:57 CST

## 2023-01-23 ENCOUNTER — OFFICE VISIT (OUTPATIENT)
Dept: FAMILY MEDICINE CLINIC | Facility: CLINIC | Age: 38
End: 2023-01-23
Payer: COMMERCIAL

## 2023-01-23 VITALS
DIASTOLIC BLOOD PRESSURE: 78 MMHG | SYSTOLIC BLOOD PRESSURE: 122 MMHG | TEMPERATURE: 97.1 F | RESPIRATION RATE: 18 BRPM | WEIGHT: 180.2 LBS | HEIGHT: 70 IN | HEART RATE: 68 BPM | BODY MASS INDEX: 25.8 KG/M2 | OXYGEN SATURATION: 98 %

## 2023-01-23 DIAGNOSIS — Z23 NEED FOR INFLUENZA VACCINATION: ICD-10-CM

## 2023-01-23 DIAGNOSIS — F90.2 ATTENTION DEFICIT HYPERACTIVITY DISORDER (ADHD), COMBINED TYPE: Primary | ICD-10-CM

## 2023-01-23 DIAGNOSIS — G43.909 MIGRAINE WITHOUT STATUS MIGRAINOSUS, NOT INTRACTABLE, UNSPECIFIED MIGRAINE TYPE: ICD-10-CM

## 2023-01-23 DIAGNOSIS — G89.29 CHRONIC PAIN OF BOTH SHOULDERS: ICD-10-CM

## 2023-01-23 DIAGNOSIS — R35.0 URINARY FREQUENCY: ICD-10-CM

## 2023-01-23 DIAGNOSIS — M25.511 CHRONIC PAIN OF BOTH SHOULDERS: ICD-10-CM

## 2023-01-23 DIAGNOSIS — M25.512 CHRONIC PAIN OF BOTH SHOULDERS: ICD-10-CM

## 2023-01-23 DIAGNOSIS — F51.01 PRIMARY INSOMNIA: ICD-10-CM

## 2023-01-23 PROCEDURE — 90471 IMMUNIZATION ADMIN: CPT | Performed by: NURSE PRACTITIONER

## 2023-01-23 PROCEDURE — 90686 IIV4 VACC NO PRSV 0.5 ML IM: CPT | Performed by: NURSE PRACTITIONER

## 2023-01-23 PROCEDURE — 99214 OFFICE O/P EST MOD 30 MIN: CPT | Performed by: NURSE PRACTITIONER

## 2023-01-23 NOTE — PROGRESS NOTES
Subjective   Chief Complaint:  Medication check-up.    History of Present Illness:  This 37 y.o. male was seen in the office today.      He reports he is doing well on Adderall and denies any complaints or concerns. He denies any side effects from the medication. He takes the medication for focus and hyperactivity.    The patient continues to take trazodone for sleep. He denies any flare-ups with his migraine medication.    He reports getting up approximately 3 times per night to void. The patient inquires about when he will start getting a prostate exam. He denies any penile drainage or dripping. He denies being very sexually active. He is .    The patient inquires if he could be tested for arthritis. He advises he tore his labrum in his right shoulder previously while playing baseball in college. He reports consistent soreness in his right shoulder, where he is unable to lay on his right arm without cradling a pillow. The patient advises over the past year he has been experiencing similar pain in his left shoulder and that his mobility has decreased. He adds he is a  and he noticed he was unable to reach over his head without feeling tightness. The patient reports difficulty sleeping secondary to the pain in his bilateral shoulders and his back.     He advises he is fasting this morning.     The patient denies receiving his influenza vaccine. He is concerned about feeling sick after getting the vaccine, since he got severely sick after getting the COVID-19 vaccine.     No Known Allergies   Current Outpatient Medications on File Prior to Visit   Medication Sig   • amphetamine-dextroamphetamine (ADDERALL) 10 MG tablet Take 1 tablet by mouth Daily.   • amphetamine-dextroamphetamine XR (Adderall XR) 30 MG 24 hr capsule Take 1 capsule by mouth Daily   • SUMAtriptan (IMITREX) 50 MG tablet Take 50 mg by mouth Daily As Needed for Headache. May repeat in 2 hours if needed.   • traZODone (DESYREL) 50 MG  "tablet Take 1 tablet by mouth At Night As Needed for Sleep.     No current facility-administered medications on file prior to visit.      Past Medical, Surgical, Social, and Family History:  Past Medical History:   Diagnosis Date   • ADHD (attention deficit hyperactivity disorder)      Past Surgical History:   Procedure Laterality Date   • SHOULDER SURGERY Right      Social History     Socioeconomic History   • Marital status:    Tobacco Use   • Smoking status: Never   • Smokeless tobacco: Never   Substance and Sexual Activity   • Alcohol use: Never   • Drug use: Never   • Sexual activity: Defer     Family History   Problem Relation Age of Onset   • Breast cancer Mother 53   • Melanoma Father    • Breast cancer Maternal Grandmother        Objective   Physical Exam  Constitutional:       General: He is not in acute distress.  Cardiovascular:      Rate and Rhythm: Normal rate and regular rhythm.      Pulses: Normal pulses.      Heart sounds: No murmur heard.    No friction rub. No gallop.   Pulmonary:      Effort: Pulmonary effort is normal. No respiratory distress.      Breath sounds: Normal breath sounds. No wheezing or rhonchi.   Musculoskeletal:      Comments: Pain limitation with left arm abduction.   Neurological:      Mental Status: He is alert.     /78 (BP Location: Right arm, Patient Position: Sitting, Cuff Size: Adult)   Pulse 68   Temp 97.1 °F (36.2 °C) (Infrared)   Resp 18   Ht 177.8 cm (70\")   Wt 81.7 kg (180 lb 3.2 oz)   SpO2 98%   BMI 25.86 kg/m²     Prior Visit Notes/Records, Lab, Imaging, and Diagnostic Results Reviewed:  CBC:  Lab Results - Last 18 Months   Lab Units 09/09/22  0821 02/01/22  0713   WBC 10*3/mm3 7.78 6.99   HEMOGLOBIN g/dL 15.1 13.9   HEMATOCRIT % 43.1 39.9   PLATELETS 10*3/mm3 270 271      Chemistry:  Lab Results - Last 18 Months   Lab Units 09/09/22  0821 02/01/22  0713   SODIUM mmol/L 139 142   POTASSIUM mmol/L 4.4 4.4   CHLORIDE mmol/L 103 103   TOTAL CO2 " mmol/L 27.7 29.9*   GLUCOSE mg/dL 99 96   BUN mg/dL 20 27*   CREATININE mg/dL 1.01 1.22   EGFR IF NONAFRICN AM mL/min/1.73  --  67   EGFR IF AFRICN AM mL/min/1.73  --  81   EGFR RESULT mL/min/1.73 98.2  --    CALCIUM mg/dL 9.9 9.1     Lab Results - Last 18 Months   Lab Units 09/09/22  0821 02/01/22  0713   ALT (SGPT) U/L 33 27   AST (SGOT) U/L 30 26   ALK PHOS U/L 57 55       Assessment & Plan   Diagnoses and all orders for this visit:    1. Attention deficit hyperactivity disorder (ADHD), combined type (Primary)  -     CBC & Differential  -     Comprehensive Metabolic Panel  -     Lipid Panel  -     TSH    2. Need for influenza vaccination  -     FluLaval/Fluzone >6 mos ([id number])    3. Primary insomnia    4. Migraine without status migrainosus, not intractable, unspecified migraine type    5. Urinary frequency  -     UA / M With / Rflx Culture([facility] ONLY) - Urine, Clean Catch  -     Chlamydia trachomatis, Neisseria gonorrhoeae, Trichomonas vaginalis, PCR - Urine, Urine, Clean Catch    6. Chronic pain of both shoulders  -     XR Shoulder 2+ View Bilateral (In Office); Future    7. BMI 25.0-25.9,adult    Discussion:  Advised and educated plan of care. he patient will complete laboratory studies and obtain x-rays of his bilateral shoulders as discussed. He will receive his influenza vaccine today. Refills were sent to his pharmacy.     BMI is >= 25 and <30. (Overweight) The following options were offered after discussion;: exercise counseling/recommendations and nutrition counseling/recommendations    Follow-up:  Return in about 6 months (around 7/23/2023) for Follow-Up.    Transcribed from ambient dictation for PUMA Pantoja by Madelin Dailey.  01/23/23   11:35 CST    Patient or patient representative verbalized consent to the visit recording.  I have personally performed the services described in this document as transcribed by the above individual, and it is both accurate and complete.    Electronically  signed by Gutierrez Miller, 01/23/23, 11:35 AM CST.

## 2023-01-25 LAB
ALBUMIN SERPL-MCNC: 4.5 G/DL (ref 3.5–5.2)
ALBUMIN/GLOB SERPL: 2.1 G/DL
ALP SERPL-CCNC: 66 U/L (ref 39–117)
ALT SERPL-CCNC: 44 U/L (ref 1–41)
APPEARANCE UR: CLEAR
AST SERPL-CCNC: 35 U/L (ref 1–40)
BACTERIA #/AREA URNS HPF: NORMAL /HPF
BASOPHILS # BLD AUTO: 0.05 10*3/MM3 (ref 0–0.2)
BASOPHILS NFR BLD AUTO: 0.8 % (ref 0–1.5)
BILIRUB SERPL-MCNC: 0.6 MG/DL (ref 0–1.2)
BILIRUB UR QL STRIP: NEGATIVE
BUN SERPL-MCNC: 17 MG/DL (ref 6–20)
BUN/CREAT SERPL: 18.5 (ref 7–25)
C TRACH RRNA SPEC QL NAA+PROBE: NEGATIVE
CALCIUM SERPL-MCNC: 9.5 MG/DL (ref 8.6–10.5)
CASTS URNS QL MICRO: NORMAL /LPF
CHLORIDE SERPL-SCNC: 104 MMOL/L (ref 98–107)
CHOLEST SERPL-MCNC: 129 MG/DL (ref 0–200)
CO2 SERPL-SCNC: 30 MMOL/L (ref 22–29)
COLOR UR: YELLOW
CREAT SERPL-MCNC: 0.92 MG/DL (ref 0.76–1.27)
EGFRCR SERPLBLD CKD-EPI 2021: 109.9 ML/MIN/1.73
EOSINOPHIL # BLD AUTO: 0.12 10*3/MM3 (ref 0–0.4)
EOSINOPHIL NFR BLD AUTO: 1.8 % (ref 0.3–6.2)
EPI CELLS #/AREA URNS HPF: NORMAL /HPF (ref 0–10)
ERYTHROCYTE [DISTWIDTH] IN BLOOD BY AUTOMATED COUNT: 13.1 % (ref 12.3–15.4)
GLOBULIN SER CALC-MCNC: 2.1 GM/DL
GLUCOSE SERPL-MCNC: 99 MG/DL (ref 65–99)
GLUCOSE UR QL STRIP: NEGATIVE
HCT VFR BLD AUTO: 45 % (ref 37.5–51)
HDLC SERPL-MCNC: 52 MG/DL (ref 40–60)
HGB BLD-MCNC: 15.3 G/DL (ref 13–17.7)
HGB UR QL STRIP: NEGATIVE
IMM GRANULOCYTES # BLD AUTO: 0.02 10*3/MM3 (ref 0–0.05)
IMM GRANULOCYTES NFR BLD AUTO: 0.3 % (ref 0–0.5)
KETONES UR QL STRIP: NEGATIVE
LDLC SERPL CALC-MCNC: 65 MG/DL (ref 0–100)
LEUKOCYTE ESTERASE UR QL STRIP: NEGATIVE
LYMPHOCYTES # BLD AUTO: 2.26 10*3/MM3 (ref 0.7–3.1)
LYMPHOCYTES NFR BLD AUTO: 34.1 % (ref 19.6–45.3)
MCH RBC QN AUTO: 31.3 PG (ref 26.6–33)
MCHC RBC AUTO-ENTMCNC: 34 G/DL (ref 31.5–35.7)
MCV RBC AUTO: 92 FL (ref 79–97)
MICRO URNS: NORMAL
MICRO URNS: NORMAL
MONOCYTES # BLD AUTO: 0.74 10*3/MM3 (ref 0.1–0.9)
MONOCYTES NFR BLD AUTO: 11.2 % (ref 5–12)
N GONORRHOEA RRNA SPEC QL NAA+PROBE: NEGATIVE
NEUTROPHILS # BLD AUTO: 3.43 10*3/MM3 (ref 1.7–7)
NEUTROPHILS NFR BLD AUTO: 51.8 % (ref 42.7–76)
NITRITE UR QL STRIP: NEGATIVE
NRBC BLD AUTO-RTO: 0 /100 WBC (ref 0–0.2)
PH UR STRIP: 6.5 [PH] (ref 5–7.5)
PLATELET # BLD AUTO: 266 10*3/MM3 (ref 140–450)
POTASSIUM SERPL-SCNC: 4.7 MMOL/L (ref 3.5–5.2)
PROT SERPL-MCNC: 6.6 G/DL (ref 6–8.5)
PROT UR QL STRIP: NEGATIVE
RBC # BLD AUTO: 4.89 10*6/MM3 (ref 4.14–5.8)
RBC #/AREA URNS HPF: NORMAL /HPF (ref 0–2)
SODIUM SERPL-SCNC: 138 MMOL/L (ref 136–145)
SP GR UR STRIP: 1.02 (ref 1–1.03)
T VAGINALIS RRNA SPEC QL NAA+PROBE: NEGATIVE
TRIGL SERPL-MCNC: 55 MG/DL (ref 0–150)
TSH SERPL DL<=0.005 MIU/L-ACNC: 1.92 UIU/ML (ref 0.27–4.2)
URINALYSIS REFLEX: NORMAL
UROBILINOGEN UR STRIP-MCNC: 0.2 MG/DL (ref 0.2–1)
VLDLC SERPL CALC-MCNC: 12 MG/DL (ref 5–40)
WBC # BLD AUTO: 6.62 10*3/MM3 (ref 3.4–10.8)
WBC #/AREA URNS HPF: NORMAL /HPF (ref 0–5)

## 2023-01-25 NOTE — PROGRESS NOTES
Reviewed results - M[employer] message sent.  If not seen in 3 days (3 day alert set), will send to pool to call the message.      Electronically signed by PUMA Pantoja, 01/25/23, 8:04 AM CST.

## 2023-02-20 DIAGNOSIS — F90.2 ATTENTION DEFICIT HYPERACTIVITY DISORDER (ADHD), COMBINED TYPE: ICD-10-CM

## 2023-02-20 NOTE — TELEPHONE ENCOUNTER
Caller: RACHEL ROME    Relationship: SELF     Best call back number:    [phone] (Home)         Requested Prescriptions:       amphetamine-dextroamphetamine XR (Adderall XR) 30 MG 24 hr capsule  30 mg, Daily     amphetamine-dextroamphetamine (ADDERALL) 10 MG tablet  10 mg, Daily         Pharmacy where request should be sent:    [place], [place] - [address] [facility] [phone] [facility] [phone]   [phone]  Additional details provided by patient: NONE     Does the patient have less than a 3 day supply:  [x] Yes  [] No    Would you like a call back once the refill request has been completed: [x] Yes [] No    If the office needs to give you a call back, can they leave a voicemail: [x] Yes [] No    Odell Colon Rep   02/20/23 09:39 CST

## 2023-02-21 RX ORDER — DEXTROAMPHETAMINE SACCHARATE, AMPHETAMINE ASPARTATE, DEXTROAMPHETAMINE SULFATE AND AMPHETAMINE SULFATE 2.5; 2.5; 2.5; 2.5 MG/1; MG/1; MG/1; MG/1
10 TABLET ORAL DAILY
Qty: 30 TABLET | Refills: 0 | Status: SHIPPED | OUTPATIENT
Start: 2023-02-21 | End: 2023-03-24 | Stop reason: SDUPTHER

## 2023-02-21 RX ORDER — DEXTROAMPHETAMINE SACCHARATE, AMPHETAMINE ASPARTATE MONOHYDRATE, DEXTROAMPHETAMINE SULFATE AND AMPHETAMINE SULFATE 7.5; 7.5; 7.5; 7.5 MG/1; MG/1; MG/1; MG/1
30 CAPSULE, EXTENDED RELEASE ORAL DAILY
Qty: 30 CAPSULE | Refills: 0 | Status: SHIPPED | OUTPATIENT
Start: 2023-02-21 | End: 2023-03-24 | Stop reason: SDUPTHER

## 2023-03-24 DIAGNOSIS — F51.01 PRIMARY INSOMNIA: ICD-10-CM

## 2023-03-24 DIAGNOSIS — F90.2 ATTENTION DEFICIT HYPERACTIVITY DISORDER (ADHD), COMBINED TYPE: ICD-10-CM

## 2023-03-24 RX ORDER — DEXTROAMPHETAMINE SACCHARATE, AMPHETAMINE ASPARTATE MONOHYDRATE, DEXTROAMPHETAMINE SULFATE AND AMPHETAMINE SULFATE 7.5; 7.5; 7.5; 7.5 MG/1; MG/1; MG/1; MG/1
30 CAPSULE, EXTENDED RELEASE ORAL DAILY
Qty: 30 CAPSULE | Refills: 0 | Status: SHIPPED | OUTPATIENT
Start: 2023-03-24

## 2023-03-24 RX ORDER — TRAZODONE HYDROCHLORIDE 50 MG/1
50 TABLET ORAL NIGHTLY PRN
Qty: 30 TABLET | Refills: 5 | Status: SHIPPED | OUTPATIENT
Start: 2023-03-24

## 2023-03-24 RX ORDER — DEXTROAMPHETAMINE SACCHARATE, AMPHETAMINE ASPARTATE, DEXTROAMPHETAMINE SULFATE AND AMPHETAMINE SULFATE 2.5; 2.5; 2.5; 2.5 MG/1; MG/1; MG/1; MG/1
10 TABLET ORAL DAILY
Qty: 30 TABLET | Refills: 0 | Status: SHIPPED | OUTPATIENT
Start: 2023-03-24

## 2023-03-24 NOTE — TELEPHONE ENCOUNTER
Caller: Rod Burnham ALDO    Relationship: Self    Best call back number: [phone]    Requested Prescriptions:   Requested Prescriptions     Pending Prescriptions Disp Refills   • amphetamine-dextroamphetamine XR (Adderall XR) 30 MG 24 hr capsule 30 capsule 0     Sig: Take 1 capsule by mouth Daily   • amphetamine-dextroamphetamine (ADDERALL) 10 MG tablet 30 tablet 0     Sig: Take 1 tablet by mouth Daily.   • traZODone (DESYREL) 50 MG tablet 30 tablet 5     Sig: Take 1 tablet by mouth At Night As Needed for Sleep.        [place] [facility] [place] [address] [facility] [phone] [facility] [phone] FX     Last office visit with prescribing clinician: 1/23/2023   Last telemedicine visit with prescribing clinician: 7/24/2023   Next office visit with prescribing clinician: 7/24/2023     Additional details provided by patient: PATIENT IS COMPLETELY OUT OF HIS MEDICATIONS    Does the patient have less than a 3 day supply:  [x] Yes  [] No    Would you like a call back once the refill request has been completed: [x] Yes [] No    If the office needs to give you a call back, can they leave a voicemail: [x] Yes [] No    Odell Andre Rep   03/24/23 08:30 CDT

## 2023-03-24 NOTE — TELEPHONE ENCOUNTER
[facility] WNL  Per Protocol Last Refill 02/21/2023    Rx Refill Note  Requested Prescriptions     Pending Prescriptions Disp Refills   • amphetamine-dextroamphetamine XR (Adderall XR) 30 MG 24 hr capsule 30 capsule 0     Sig: Take 1 capsule by mouth Daily   • amphetamine-dextroamphetamine (ADDERALL) 10 MG tablet 30 tablet 0     Sig: Take 1 tablet by mouth Daily.   • traZODone (DESYREL) 50 MG tablet 30 tablet 5     Sig: Take 1 tablet by mouth At Night As Needed for Sleep.      Last office visit with prescribing clinician: 1/23/2023      Next office visit with prescribing clinician: 7/24/2023            Vinnie Recinos MA  03/24/23, 09:10 CDT

## 2023-04-27 ENCOUNTER — TELEPHONE (OUTPATIENT)
Dept: FAMILY MEDICINE CLINIC | Facility: CLINIC | Age: 38
End: 2023-04-27
Payer: COMMERCIAL

## 2023-04-27 DIAGNOSIS — F90.2 ATTENTION DEFICIT HYPERACTIVITY DISORDER (ADHD), COMBINED TYPE: ICD-10-CM

## 2023-04-27 RX ORDER — DEXTROAMPHETAMINE SACCHARATE, AMPHETAMINE ASPARTATE MONOHYDRATE, DEXTROAMPHETAMINE SULFATE AND AMPHETAMINE SULFATE 7.5; 7.5; 7.5; 7.5 MG/1; MG/1; MG/1; MG/1
30 CAPSULE, EXTENDED RELEASE ORAL DAILY
Qty: 30 CAPSULE | Refills: 0 | Status: SHIPPED | OUTPATIENT
Start: 2023-04-27

## 2023-04-27 RX ORDER — DEXTROAMPHETAMINE SACCHARATE, AMPHETAMINE ASPARTATE, DEXTROAMPHETAMINE SULFATE AND AMPHETAMINE SULFATE 2.5; 2.5; 2.5; 2.5 MG/1; MG/1; MG/1; MG/1
10 TABLET ORAL DAILY
Qty: 30 TABLET | Refills: 0 | Status: SHIPPED | OUTPATIENT
Start: 2023-04-27

## 2023-04-27 NOTE — TELEPHONE ENCOUNTER
Caller: Rod Burnham    Relationship: Self    Best call back number:   [phone]   Requested Prescriptions:   Requested Prescriptions      No prescriptions requested or ordered in this encounter    amphetamine-dextroamphetamine (ADDERALL) 10 MG tablet  amphetamine-dextroamphetamine XR (Adderall XR) 30 MG 24 hr     Pharmacy where request should be sent:      Last office visit with prescribing clinician: 1/23/2023   Last telemedicine visit with prescribing clinician: 7/24/2023   Next office visit with prescribing clinician: 7/24/2023     Additional details provided by patient:   Does the patient have less than a 3 day supply:  [x] Yes  [] No    Would you like a call back once the refill request has been completed: [x] Yes [] No    If the office needs to give you a call back, can they leave a voicemail: [x] Yes [] No    Odell Aguila Rep   04/27/23 07:59 CDT

## 2023-05-30 DIAGNOSIS — F51.01 PRIMARY INSOMNIA: ICD-10-CM

## 2023-05-30 DIAGNOSIS — F90.2 ATTENTION DEFICIT HYPERACTIVITY DISORDER (ADHD), COMBINED TYPE: ICD-10-CM

## 2023-05-30 RX ORDER — DEXTROAMPHETAMINE SACCHARATE, AMPHETAMINE ASPARTATE MONOHYDRATE, DEXTROAMPHETAMINE SULFATE AND AMPHETAMINE SULFATE 7.5; 7.5; 7.5; 7.5 MG/1; MG/1; MG/1; MG/1
30 CAPSULE, EXTENDED RELEASE ORAL DAILY
Qty: 30 CAPSULE | Refills: 0 | Status: SHIPPED | OUTPATIENT
Start: 2023-05-30

## 2023-05-30 RX ORDER — DEXTROAMPHETAMINE SACCHARATE, AMPHETAMINE ASPARTATE, DEXTROAMPHETAMINE SULFATE AND AMPHETAMINE SULFATE 2.5; 2.5; 2.5; 2.5 MG/1; MG/1; MG/1; MG/1
10 TABLET ORAL DAILY
Qty: 30 TABLET | Refills: 0 | Status: SHIPPED | OUTPATIENT
Start: 2023-05-30

## 2023-05-30 RX ORDER — TRAZODONE HYDROCHLORIDE 50 MG/1
50 TABLET ORAL NIGHTLY PRN
Qty: 30 TABLET | Refills: 5 | Status: SHIPPED | OUTPATIENT
Start: 2023-05-30

## 2023-05-30 NOTE — TELEPHONE ENCOUNTER
Caller: Rod Burnham    Relationship: Self    Best call back number: [phone]    Requested Prescriptions:   Requested Prescriptions     Pending Prescriptions Disp Refills   • amphetamine-dextroamphetamine XR (Adderall XR) 30 MG 24 hr capsule 30 capsule 0     Sig: Take 1 capsule by mouth Daily   • amphetamine-dextroamphetamine (ADDERALL) 10 MG tablet 30 tablet 0     Sig: Take 1 tablet by mouth Daily.   • traZODone (DESYREL) 50 MG tablet 30 tablet 5     Sig: Take 1 tablet by mouth At Night As Needed for Sleep.        Pharmacy where request should be sent: [place], [place] - [address] [facility] [phone] [facility] [phone] FX     Last office visit with prescribing clinician: 1/23/2023   Last telemedicine visit with prescribing clinician: Visit date not found   Next office visit with prescribing clinician: 7/24/2023     Additional details provided by patient: LESS THEN 3 DAYS    Does the patient have less than a 3 day supply:  [x] Yes  [] No    Would you like a call back once the refill request has been completed: [x] Yes [] No    If the office needs to give you a call back, can they leave a voicemail: [x] Yes [] No    Odell Barber Rep   05/30/23 09:19 CDT

## 2023-05-30 NOTE — TELEPHONE ENCOUNTER
Rx Refill Note  Requested Prescriptions     Pending Prescriptions Disp Refills    amphetamine-dextroamphetamine XR (Adderall XR) 30 MG 24 hr capsule 30 capsule 0     Sig: Take 1 capsule by mouth Daily    amphetamine-dextroamphetamine (ADDERALL) 10 MG tablet 30 tablet 0     Sig: Take 1 tablet by mouth Daily.    traZODone (DESYREL) 50 MG tablet 30 tablet 5     Sig: Take 1 tablet by mouth At Night As Needed for Sleep.      Last office visit with prescribing clinician: 1/23/2023      Next office visit with prescribing clinician: 7/24/2023            Vinnie Recinos MA  05/30/23, 10:04 CDT

## 2023-08-10 ENCOUNTER — OFFICE VISIT (OUTPATIENT)
Dept: FAMILY MEDICINE CLINIC | Facility: CLINIC | Age: 38
End: 2023-08-10
Payer: COMMERCIAL

## 2023-08-10 VITALS
WEIGHT: 177 LBS | SYSTOLIC BLOOD PRESSURE: 115 MMHG | HEIGHT: 70 IN | BODY MASS INDEX: 25.34 KG/M2 | TEMPERATURE: 98.3 F | DIASTOLIC BLOOD PRESSURE: 80 MMHG | OXYGEN SATURATION: 99 % | HEART RATE: 61 BPM

## 2023-08-10 DIAGNOSIS — F90.2 ATTENTION DEFICIT HYPERACTIVITY DISORDER (ADHD), COMBINED TYPE: Primary | ICD-10-CM

## 2023-08-10 DIAGNOSIS — F51.01 PRIMARY INSOMNIA: ICD-10-CM

## 2023-08-10 DIAGNOSIS — R74.01 ELEVATED TRANSAMINASE LEVEL: ICD-10-CM

## 2023-08-10 PROCEDURE — 99213 OFFICE O/P EST LOW 20 MIN: CPT | Performed by: NURSE PRACTITIONER

## 2023-08-10 NOTE — PROGRESS NOTES
Subjective   Chief Complaint:  Medication checkup    History of Present Illness:  This 38 y.o. male was seen in the office today.  Reports good effectiveness, no ill effects from Adderall.  Continues to trazodone for sleep.  Had a slightly elevated transaminase last visit-denies any alcohol use or excessive Tylenol use.    No Known Allergies   Current Outpatient Medications on File Prior to Visit   Medication Sig    amphetamine-dextroamphetamine (ADDERALL) 10 MG tablet Take 1 tablet by mouth Daily.    amphetamine-dextroamphetamine XR (Adderall XR) 30 MG 24 hr capsule Take 1 capsule by mouth Daily    SUMAtriptan (IMITREX) 50 MG tablet Take 1 tablet by mouth Daily As Needed for Headache. May repeat in 2 hours if needed.    traZODone (DESYREL) 50 MG tablet Take 1 tablet by mouth At Night As Needed for Sleep.     No current facility-administered medications on file prior to visit.      Past Medical, Surgical, Social, and Family History:  Past Medical History:   Diagnosis Date    ADHD (attention deficit hyperactivity disorder)      Past Surgical History:   Procedure Laterality Date    SHOULDER SURGERY Right      Social History     Socioeconomic History    Marital status:    Tobacco Use    Smoking status: Never    Smokeless tobacco: Never   Substance and Sexual Activity    Alcohol use: Never    Drug use: Never    Sexual activity: Defer     Family History   Problem Relation Age of Onset    Breast cancer Mother 53    Melanoma Father     Breast cancer Maternal Grandmother        Prior Visit Notes/Records, Lab, Imaging, and Diagnostic Results Reviewed:  A1C:No results for input(s): HGBA1C in the last 61804 hours.  GLUCOSE:  Lab Results - Last 18 Months   Lab Units 01/23/23  0928 09/09/22  0821   GLUCOSE mg/dL 99 99     LIPID:  Lab Results - Last 18 Months   Lab Units 01/23/23  0928 09/09/22  0821   CHOLESTEROL mg/dL 129 140   LDL CHOL mg/dL 65 69   HDL CHOL mg/dL 52 57   TRIGLYCERIDES mg/dL 55 67     PSA:No results for  "input(s): PSA in the last 39999 hours.  CBC:  Lab Results - Last 18 Months   Lab Units 01/23/23  0928 09/09/22  0821   WBC 10*3/mm3 6.62 7.78   HEMOGLOBIN g/dL 15.3 15.1   HEMATOCRIT % 45.0 43.1   PLATELETS 10*3/mm3 266 270      BMP/CMP:  Lab Results - Last 18 Months   Lab Units 01/23/23  0928 09/09/22  0821   SODIUM mmol/L 138 139   POTASSIUM mmol/L 4.7 4.4   CHLORIDE mmol/L 104 103   CO2 mmol/L 30.0* 27.7   GLUCOSE mg/dL 99 99   BUN mg/dL 17 20   CREATININE mg/dL 0.92 1.01   EGFR RESULT mL/min/1.73 109.9 98.2   CALCIUM mg/dL 9.5 9.9     HEPATIC:  Lab Results - Last 18 Months   Lab Units 01/23/23  0928 09/09/22  0821   ALT (SGPT) U/L 44* 33   AST (SGOT) U/L 35 30   ALK PHOS U/L 66 57     Vit D:No results for input(s): NLZL97OD in the last 91488 hours.  THYROID:  Lab Results - Last 18 Months   Lab Units 01/23/23  0928 09/09/22  0821   TSH uIU/mL 1.920 1.270     BMI Trend:  BMI Readings from Last 10 Encounters:   08/10/23 25.40 kg/mý   01/23/23 25.86 kg/mý   07/21/22 24.39 kg/mý   01/21/22 23.82 kg/mý   07/20/21 24.54 kg/mý   01/15/21 26.40 kg/mý   01/11/21 26.04 kg/mý     Objective   Vital Signs  /80 (BP Location: Right arm, Patient Position: Sitting, Cuff Size: Adult)   Pulse 61   Temp 98.3 øF (36.8 øC)   Ht 177.8 cm (70\")   Wt 80.3 kg (177 lb)   SpO2 99%   BMI 25.40 kg/mý   Physical Exam  Constitutional:       General: He is not in acute distress.  Neck:      Vascular: No carotid bruit.   Cardiovascular:      Rate and Rhythm: Normal rate and regular rhythm.      Pulses: Normal pulses.      Heart sounds: No murmur heard.    No friction rub. No gallop.   Pulmonary:      Effort: Pulmonary effort is normal. No respiratory distress.      Breath sounds: Normal breath sounds. No wheezing or rhonchi.   Lymphadenopathy:      Cervical: No cervical adenopathy.   Neurological:      Mental Status: He is alert.   Psychiatric:         Mood and Affect: Mood normal.         Behavior: Behavior normal.         Thought " Content: Thought content normal.         Judgment: Judgment normal.       Assessment & Plan   Diagnoses and all orders for this visit:    1. Attention deficit hyperactivity disorder (ADHD), combined type (Primary)    2. Primary insomnia    3. Elevated transaminase level  -     Comprehensive Metabolic Panel  -     Hepatitis Panel, Acute    Discussion:  Advised and educated plan of care.  Advised hopefully elevated enzymes was transient otherwise we will investigate further.       Follow-up:  Return in about 6 months (around 2/10/2024) for Follow-Up.    Electronically signed by PUMA Pantoja, 08/10/23, 8:22 AM CDT.

## 2023-08-11 LAB
ALBUMIN SERPL-MCNC: 4.3 G/DL (ref 3.5–5.2)
ALBUMIN/GLOB SERPL: 2 G/DL
ALP SERPL-CCNC: 56 U/L (ref 39–117)
ALT SERPL-CCNC: 39 U/L (ref 1–41)
AST SERPL-CCNC: 29 U/L (ref 1–40)
BILIRUB SERPL-MCNC: 0.4 MG/DL (ref 0–1.2)
BUN SERPL-MCNC: 20 MG/DL (ref 6–20)
BUN/CREAT SERPL: 22 (ref 7–25)
CALCIUM SERPL-MCNC: 9.7 MG/DL (ref 8.6–10.5)
CHLORIDE SERPL-SCNC: 103 MMOL/L (ref 98–107)
CO2 SERPL-SCNC: 27.1 MMOL/L (ref 22–29)
CREAT SERPL-MCNC: 0.91 MG/DL (ref 0.76–1.27)
EGFRCR SERPLBLD CKD-EPI 2021: 110.6 ML/MIN/1.73
GLOBULIN SER CALC-MCNC: 2.2 GM/DL
GLUCOSE SERPL-MCNC: 65 MG/DL (ref 65–99)
HAV IGM SERPL QL IA: NEGATIVE
HBV CORE IGM SERPL QL IA: NEGATIVE
HBV SURFACE AG SERPL QL IA: NEGATIVE
HCV AB SERPL QL IA: NORMAL
HCV IGG SERPL QL IA: NON REACTIVE
POTASSIUM SERPL-SCNC: 5 MMOL/L (ref 3.5–5.2)
PROT SERPL-MCNC: 6.5 G/DL (ref 6–8.5)
SODIUM SERPL-SCNC: 142 MMOL/L (ref 136–145)

## 2023-08-11 NOTE — PROGRESS NOTES
Reviewed results - [employer] message sent.  If not seen in 3 days (3 day alert set), will send to pool to call the message.      Electronically signed by PUMA Pantoja, 08/11/23, 10:40 AM CDT.

## 2023-08-16 DIAGNOSIS — F90.2 ATTENTION DEFICIT HYPERACTIVITY DISORDER (ADHD), COMBINED TYPE: ICD-10-CM

## 2023-08-16 RX ORDER — DEXTROAMPHETAMINE SACCHARATE, AMPHETAMINE ASPARTATE MONOHYDRATE, DEXTROAMPHETAMINE SULFATE AND AMPHETAMINE SULFATE 7.5; 7.5; 7.5; 7.5 MG/1; MG/1; MG/1; MG/1
30 CAPSULE, EXTENDED RELEASE ORAL DAILY
Qty: 30 CAPSULE | Refills: 0 | Status: SHIPPED | OUTPATIENT
Start: 2023-08-16

## 2023-08-16 RX ORDER — DEXTROAMPHETAMINE SACCHARATE, AMPHETAMINE ASPARTATE, DEXTROAMPHETAMINE SULFATE AND AMPHETAMINE SULFATE 2.5; 2.5; 2.5; 2.5 MG/1; MG/1; MG/1; MG/1
10 TABLET ORAL DAILY
Qty: 30 TABLET | Refills: 0 | Status: SHIPPED | OUTPATIENT
Start: 2023-08-16

## 2023-08-16 NOTE — TELEPHONE ENCOUNTER
Caller: Rod Burnham    Relationship: Self    Best call back number: [phone]     Requested Prescriptions:   Requested Prescriptions     Pending Prescriptions Disp Refills    amphetamine-dextroamphetamine XR (Adderall XR) 30 MG 24 hr capsule 30 capsule 0     Sig: Take 1 capsule by mouth Daily    amphetamine-dextroamphetamine (ADDERALL) 10 MG tablet 30 tablet 0     Sig: Take 1 tablet by mouth Daily.        Pharmacy where request should be sent: [address] [phone] [facility] [phone]      Last office visit with prescribing clinician: 8/10/2023   Last telemedicine visit with prescribing clinician: Visit date not found   Next office visit with prescribing clinician: 2/9/2024       Does the patient have less than a 3 day supply:  [x] Yes  [] No    Would you like a call back once the refill request has been completed: [x] Yes [] No    If the office needs to give you a call back, can they leave a voicemail: [x] Yes [] No    Odell Katz Rep   08/16/23 13:18 CDT

## 2023-09-18 DIAGNOSIS — F90.2 ATTENTION DEFICIT HYPERACTIVITY DISORDER (ADHD), COMBINED TYPE: ICD-10-CM

## 2023-09-18 DIAGNOSIS — F51.01 PRIMARY INSOMNIA: ICD-10-CM

## 2023-09-18 RX ORDER — TRAZODONE HYDROCHLORIDE 50 MG/1
50 TABLET ORAL NIGHTLY PRN
Qty: 30 TABLET | Refills: 5 | Status: SHIPPED | OUTPATIENT
Start: 2023-09-18

## 2023-09-18 RX ORDER — DEXTROAMPHETAMINE SACCHARATE, AMPHETAMINE ASPARTATE MONOHYDRATE, DEXTROAMPHETAMINE SULFATE AND AMPHETAMINE SULFATE 7.5; 7.5; 7.5; 7.5 MG/1; MG/1; MG/1; MG/1
30 CAPSULE, EXTENDED RELEASE ORAL DAILY
Qty: 30 CAPSULE | Refills: 0 | Status: SHIPPED | OUTPATIENT
Start: 2023-09-18

## 2023-09-18 RX ORDER — DEXTROAMPHETAMINE SACCHARATE, AMPHETAMINE ASPARTATE, DEXTROAMPHETAMINE SULFATE AND AMPHETAMINE SULFATE 2.5; 2.5; 2.5; 2.5 MG/1; MG/1; MG/1; MG/1
10 TABLET ORAL DAILY
Qty: 30 TABLET | Refills: 0 | Status: SHIPPED | OUTPATIENT
Start: 2023-09-18

## 2023-09-18 NOTE — TELEPHONE ENCOUNTER
Caller: Rod Burnham    Relationship: Self    Best call back number: [phone]  Requested Prescriptions:   Requested Prescriptions     Pending Prescriptions Disp Refills    amphetamine-dextroamphetamine XR (Adderall XR) 30 MG 24 hr capsule 30 capsule 0     Sig: Take 1 capsule by mouth Daily    amphetamine-dextroamphetamine (ADDERALL) 10 MG tablet 30 tablet 0     Sig: Take 1 tablet by mouth Daily.    traZODone (DESYREL) 50 MG tablet 30 tablet 5     Sig: Take 1 tablet by mouth At Night As Needed for Sleep.        Pharmacy where request should be sent: [place], [place] - [address] [facility] [phone] [facility] [phone]      Last office visit with prescribing clinician: 8/10/2023   Last telemedicine visit with prescribing clinician: Visit date not found   Next office visit with prescribing clinician: 2/9/2024         Does the patient have less than a 3 day supply:  [x] Yes  [] No    Would you like a call back once the refill request has been completed: [] Yes [] No    If the office needs to give you a call back, can they leave a voicemail: [] Yes [] No    Odell Pereyra   09/18/23 09:00 CDT

## 2023-09-18 NOTE — TELEPHONE ENCOUNTER
[facility] WNL  Per Protocol Last Refill 08/16/2023    Rx Refill Note  Requested Prescriptions     Pending Prescriptions Disp Refills    amphetamine-dextroamphetamine XR (Adderall XR) 30 MG 24 hr capsule 30 capsule 0     Sig: Take 1 capsule by mouth Daily    amphetamine-dextroamphetamine (ADDERALL) 10 MG tablet 30 tablet 0     Sig: Take 1 tablet by mouth Daily.    traZODone (DESYREL) 50 MG tablet 30 tablet 5     Sig: Take 1 tablet by mouth At Night As Needed for Sleep.      Last office visit with prescribing clinician: 8/10/2023      Next office visit with prescribing clinician: 2/9/2024            Vinnie Recinos MA  09/18/23, 09:42 CDT

## 2023-10-19 DIAGNOSIS — F90.2 ATTENTION DEFICIT HYPERACTIVITY DISORDER (ADHD), COMBINED TYPE: ICD-10-CM

## 2023-10-19 RX ORDER — DEXTROAMPHETAMINE SACCHARATE, AMPHETAMINE ASPARTATE, DEXTROAMPHETAMINE SULFATE AND AMPHETAMINE SULFATE 2.5; 2.5; 2.5; 2.5 MG/1; MG/1; MG/1; MG/1
10 TABLET ORAL DAILY
Qty: 30 TABLET | Refills: 0 | Status: SHIPPED | OUTPATIENT
Start: 2023-10-19

## 2023-10-19 RX ORDER — DEXTROAMPHETAMINE SACCHARATE, AMPHETAMINE ASPARTATE MONOHYDRATE, DEXTROAMPHETAMINE SULFATE AND AMPHETAMINE SULFATE 7.5; 7.5; 7.5; 7.5 MG/1; MG/1; MG/1; MG/1
30 CAPSULE, EXTENDED RELEASE ORAL DAILY
Qty: 30 CAPSULE | Refills: 0 | Status: SHIPPED | OUTPATIENT
Start: 2023-10-19

## 2023-10-19 NOTE — TELEPHONE ENCOUNTER
Caller: Tej Rod D    Relationship: Self    Best call back number: [phone]     Requested Prescriptions:   Requested Prescriptions     Pending Prescriptions Disp Refills    amphetamine-dextroamphetamine XR (Adderall XR) 30 MG 24 hr capsule 30 capsule 0     Sig: Take 1 capsule by mouth Daily    amphetamine-dextroamphetamine (ADDERALL) 10 MG tablet 30 tablet 0     Sig: Take 1 tablet by mouth Daily.        Pharmacy where request should be sent: [address] [phone] [facility] [phone] FX     Last office visit with prescribing clinician: 8/10/2023   Last telemedicine visit with prescribing clinician: Visit date not found   Next office visit with prescribing clinician: 2/9/2024         Does the patient have less than a 3 day supply:  [x] Yes  [] No    Would you like a call back once the refill request has been completed: [] Yes [x] No      Odell Johnson Rep   10/19/23 14:56 CDT

## 2023-11-28 DIAGNOSIS — F90.2 ATTENTION DEFICIT HYPERACTIVITY DISORDER (ADHD), COMBINED TYPE: ICD-10-CM

## 2023-11-28 DIAGNOSIS — F51.01 PRIMARY INSOMNIA: ICD-10-CM

## 2023-11-28 RX ORDER — DEXTROAMPHETAMINE SACCHARATE, AMPHETAMINE ASPARTATE, DEXTROAMPHETAMINE SULFATE AND AMPHETAMINE SULFATE 2.5; 2.5; 2.5; 2.5 MG/1; MG/1; MG/1; MG/1
10 TABLET ORAL DAILY
Qty: 30 TABLET | Refills: 0 | Status: SHIPPED | OUTPATIENT
Start: 2023-11-28

## 2023-11-28 RX ORDER — TRAZODONE HYDROCHLORIDE 50 MG/1
50 TABLET ORAL NIGHTLY PRN
Qty: 30 TABLET | Refills: 5 | Status: SHIPPED | OUTPATIENT
Start: 2023-11-28

## 2023-11-28 RX ORDER — DEXTROAMPHETAMINE SACCHARATE, AMPHETAMINE ASPARTATE MONOHYDRATE, DEXTROAMPHETAMINE SULFATE AND AMPHETAMINE SULFATE 7.5; 7.5; 7.5; 7.5 MG/1; MG/1; MG/1; MG/1
30 CAPSULE, EXTENDED RELEASE ORAL DAILY
Qty: 30 CAPSULE | Refills: 0 | Status: SHIPPED | OUTPATIENT
Start: 2023-11-28

## 2023-11-28 NOTE — TELEPHONE ENCOUNTER
Caller: Tej Rod D    Relationship: Self    Best call back number: [phone]     Requested Prescriptions:   Requested Prescriptions     Pending Prescriptions Disp Refills    amphetamine-dextroamphetamine XR (Adderall XR) 30 MG 24 hr capsule 30 capsule 0     Sig: Take 1 capsule by mouth Daily    amphetamine-dextroamphetamine (ADDERALL) 10 MG tablet 30 tablet 0     Sig: Take 1 tablet by mouth Daily.    traZODone (DESYREL) 50 MG tablet 30 tablet 5     Sig: Take 1 tablet by mouth At Night As Needed for Sleep.        Pharmacy where request should be sent: [place], [place] - [address] [facility] [phone] [facility] [phone]      Last office visit with prescribing clinician: 8/10/2023   Last telemedicine visit with prescribing clinician: Visit date not found   Next office visit with prescribing clinician: 2/9/2024     Additional details provided by patient: COMPLETELY OUT     Does the patient have less than a 3 day supply:  [x] Yes  [] No    Would you like a call back once the refill request has been completed: [x] Yes [] No    If the office needs to give you a call back, can they leave a voicemail: [x] Yes [] No    Odell Atwood Rep   11/28/23 10:07 CST

## 2023-11-28 NOTE — TELEPHONE ENCOUNTER
[facility] WNL    Per protocol last refill: 10/19/2023    Rx Refill Note  Requested Prescriptions     Pending Prescriptions Disp Refills    amphetamine-dextroamphetamine XR (Adderall XR) 30 MG 24 hr capsule 30 capsule 0     Sig: Take 1 capsule by mouth Daily    amphetamine-dextroamphetamine (ADDERALL) 10 MG tablet 30 tablet 0     Sig: Take 1 tablet by mouth Daily.    traZODone (DESYREL) 50 MG tablet 30 tablet 5     Sig: Take 1 tablet by mouth At Night As Needed for Sleep.      Last office visit with prescribing clinician: 8/10/2023   Last telemedicine visit with prescribing clinician: Visit date not found   Next office visit with prescribing clinician: 2/9/2024                         Would you like a call back once the refill request has been completed: [] Yes [] No    If the office needs to give you a call back, can they leave a voicemail: [] Yes [] No    Odell Boone Rep  11/28/23, 10:52 CST

## 2023-12-07 ENCOUNTER — HOSPITAL ENCOUNTER (OUTPATIENT)
Age: 38
Setting detail: OBSERVATION
Discharge: HOME | End: 2023-12-08
Attending: EMERGENCY MEDICINE | Admitting: HOSPITALIST
Payer: COMMERCIAL

## 2023-12-07 ENCOUNTER — APPOINTMENT (OUTPATIENT)
Dept: CT IMAGING | Age: 38
End: 2023-12-07
Payer: COMMERCIAL

## 2023-12-07 DIAGNOSIS — R56.9 SEIZURE (HCC): ICD-10-CM

## 2023-12-07 DIAGNOSIS — R55 SYNCOPE, UNSPECIFIED SYNCOPE TYPE: Primary | ICD-10-CM

## 2023-12-07 DIAGNOSIS — R93.89 ABNORMAL CT SCAN: ICD-10-CM

## 2023-12-07 LAB
ALBUMIN SERPL-MCNC: 4.4 G/DL (ref 3.5–5.2)
ALP SERPL-CCNC: 59 U/L (ref 40–130)
ALT SERPL-CCNC: 30 U/L (ref 5–41)
ANION GAP SERPL CALCULATED.3IONS-SCNC: 7 MMOL/L (ref 7–19)
AST SERPL-CCNC: 26 U/L (ref 5–40)
BASOPHILS # BLD: 0.1 K/UL (ref 0–0.2)
BASOPHILS NFR BLD: 0.6 % (ref 0–1)
BILIRUB SERPL-MCNC: 0.4 MG/DL (ref 0.2–1.2)
BUN SERPL-MCNC: 18 MG/DL (ref 6–20)
CALCIUM SERPL-MCNC: 9.3 MG/DL (ref 8.6–10)
CHLORIDE SERPL-SCNC: 104 MMOL/L (ref 98–111)
CO2 SERPL-SCNC: 29 MMOL/L (ref 22–29)
CREAT SERPL-MCNC: 1.1 MG/DL (ref 0.5–1.2)
EOSINOPHIL # BLD: 0.1 K/UL (ref 0–0.6)
EOSINOPHIL NFR BLD: 1.4 % (ref 0–5)
ERYTHROCYTE [DISTWIDTH] IN BLOOD BY AUTOMATED COUNT: 12.5 % (ref 11.5–14.5)
GLUCOSE SERPL-MCNC: 112 MG/DL (ref 74–109)
HCT VFR BLD AUTO: 47.9 % (ref 42–52)
HGB BLD-MCNC: 16.4 G/DL (ref 14–18)
IMM GRANULOCYTES # BLD: 0 K/UL
LIPASE SERPL-CCNC: 45 U/L (ref 13–60)
LYMPHOCYTES # BLD: 2.1 K/UL (ref 1.1–4.5)
LYMPHOCYTES NFR BLD: 27.5 % (ref 20–40)
MCH RBC QN AUTO: 32.1 PG (ref 27–31)
MCHC RBC AUTO-ENTMCNC: 34.2 G/DL (ref 33–37)
MCV RBC AUTO: 93.7 FL (ref 80–94)
MONOCYTES # BLD: 0.8 K/UL (ref 0–0.9)
MONOCYTES NFR BLD: 10.4 % (ref 0–10)
NEUTROPHILS # BLD: 4.7 K/UL (ref 1.5–7.5)
NEUTS SEG NFR BLD: 60 % (ref 50–65)
PLATELET # BLD AUTO: 266 K/UL (ref 130–400)
PMV BLD AUTO: 9.6 FL (ref 9.4–12.4)
POTASSIUM SERPL-SCNC: 4.8 MMOL/L (ref 3.5–5)
PROT SERPL-MCNC: 7.1 G/DL (ref 6.6–8.7)
RBC # BLD AUTO: 5.11 M/UL (ref 4.7–6.1)
SODIUM SERPL-SCNC: 140 MMOL/L (ref 136–145)
TROPONIN, HIGH SENSITIVITY: <6 NG/L (ref 0–22)
WBC # BLD AUTO: 7.8 K/UL (ref 4.8–10.8)

## 2023-12-07 PROCEDURE — 36415 COLL VENOUS BLD VENIPUNCTURE: CPT

## 2023-12-07 PROCEDURE — 70450 CT HEAD/BRAIN W/O DYE: CPT

## 2023-12-07 PROCEDURE — 2580000003 HC RX 258: Performed by: EMERGENCY MEDICINE

## 2023-12-07 PROCEDURE — 99285 EMERGENCY DEPT VISIT HI MDM: CPT

## 2023-12-07 PROCEDURE — G0378 HOSPITAL OBSERVATION PER HR: HCPCS

## 2023-12-07 PROCEDURE — 84484 ASSAY OF TROPONIN QUANT: CPT

## 2023-12-07 PROCEDURE — 94760 N-INVAS EAR/PLS OXIMETRY 1: CPT

## 2023-12-07 PROCEDURE — 74177 CT ABD & PELVIS W/CONTRAST: CPT

## 2023-12-07 PROCEDURE — 6360000004 HC RX CONTRAST MEDICATION: Performed by: EMERGENCY MEDICINE

## 2023-12-07 PROCEDURE — 80053 COMPREHEN METABOLIC PANEL: CPT

## 2023-12-07 PROCEDURE — 85025 COMPLETE CBC W/AUTO DIFF WBC: CPT

## 2023-12-07 PROCEDURE — 83690 ASSAY OF LIPASE: CPT

## 2023-12-07 PROCEDURE — 70496 CT ANGIOGRAPHY HEAD: CPT

## 2023-12-07 PROCEDURE — 71275 CT ANGIOGRAPHY CHEST: CPT

## 2023-12-07 RX ORDER — ENOXAPARIN SODIUM 100 MG/ML
40 INJECTION SUBCUTANEOUS DAILY
Status: DISCONTINUED | OUTPATIENT
Start: 2023-12-08 | End: 2023-12-08 | Stop reason: HOSPADM

## 2023-12-07 RX ORDER — SODIUM CHLORIDE 0.9 % (FLUSH) 0.9 %
5-40 SYRINGE (ML) INJECTION EVERY 12 HOURS SCHEDULED
Status: DISCONTINUED | OUTPATIENT
Start: 2023-12-07 | End: 2023-12-08 | Stop reason: HOSPADM

## 2023-12-07 RX ORDER — ONDANSETRON 2 MG/ML
4 INJECTION INTRAMUSCULAR; INTRAVENOUS EVERY 6 HOURS PRN
Status: DISCONTINUED | OUTPATIENT
Start: 2023-12-07 | End: 2023-12-08 | Stop reason: HOSPADM

## 2023-12-07 RX ORDER — CALCIUM CARBONATE 500 MG/1
500 TABLET, CHEWABLE ORAL 3 TIMES DAILY PRN
Status: DISCONTINUED | OUTPATIENT
Start: 2023-12-07 | End: 2023-12-08 | Stop reason: HOSPADM

## 2023-12-07 RX ORDER — 0.9 % SODIUM CHLORIDE 0.9 %
1000 INTRAVENOUS SOLUTION INTRAVENOUS ONCE
Status: COMPLETED | OUTPATIENT
Start: 2023-12-07 | End: 2023-12-07

## 2023-12-07 RX ORDER — MAGNESIUM SULFATE IN WATER 40 MG/ML
2000 INJECTION, SOLUTION INTRAVENOUS PRN
Status: DISCONTINUED | OUTPATIENT
Start: 2023-12-07 | End: 2023-12-08 | Stop reason: HOSPADM

## 2023-12-07 RX ORDER — ACETAMINOPHEN 650 MG/1
650 SUPPOSITORY RECTAL EVERY 4 HOURS PRN
Status: DISCONTINUED | OUTPATIENT
Start: 2023-12-07 | End: 2023-12-08 | Stop reason: HOSPADM

## 2023-12-07 RX ORDER — POLYETHYLENE GLYCOL 3350 17 G/17G
17 POWDER, FOR SOLUTION ORAL DAILY PRN
Status: DISCONTINUED | OUTPATIENT
Start: 2023-12-07 | End: 2023-12-08 | Stop reason: HOSPADM

## 2023-12-07 RX ORDER — TAMSULOSIN HYDROCHLORIDE 0.4 MG/1
0.4 CAPSULE ORAL DAILY
Status: DISCONTINUED | OUTPATIENT
Start: 2023-12-08 | End: 2023-12-08 | Stop reason: HOSPADM

## 2023-12-07 RX ORDER — ACETAMINOPHEN 325 MG/1
650 TABLET ORAL EVERY 4 HOURS PRN
Status: DISCONTINUED | OUTPATIENT
Start: 2023-12-07 | End: 2023-12-08 | Stop reason: HOSPADM

## 2023-12-07 RX ORDER — POTASSIUM CHLORIDE 7.45 MG/ML
10 INJECTION INTRAVENOUS PRN
Status: DISCONTINUED | OUTPATIENT
Start: 2023-12-07 | End: 2023-12-08 | Stop reason: HOSPADM

## 2023-12-07 RX ORDER — SODIUM CHLORIDE 0.9 % (FLUSH) 0.9 %
5-40 SYRINGE (ML) INJECTION PRN
Status: DISCONTINUED | OUTPATIENT
Start: 2023-12-07 | End: 2023-12-08 | Stop reason: HOSPADM

## 2023-12-07 RX ORDER — IBUPROFEN 600 MG/1
600 TABLET ORAL EVERY 6 HOURS PRN
Status: DISCONTINUED | OUTPATIENT
Start: 2023-12-07 | End: 2023-12-08 | Stop reason: HOSPADM

## 2023-12-07 RX ORDER — SODIUM CHLORIDE 9 MG/ML
INJECTION, SOLUTION INTRAVENOUS PRN
Status: DISCONTINUED | OUTPATIENT
Start: 2023-12-07 | End: 2023-12-08 | Stop reason: HOSPADM

## 2023-12-07 RX ORDER — ONDANSETRON 4 MG/1
4 TABLET, ORALLY DISINTEGRATING ORAL EVERY 8 HOURS PRN
Status: DISCONTINUED | OUTPATIENT
Start: 2023-12-07 | End: 2023-12-08 | Stop reason: HOSPADM

## 2023-12-07 RX ORDER — POTASSIUM CHLORIDE 20 MEQ/1
40 TABLET, EXTENDED RELEASE ORAL PRN
Status: DISCONTINUED | OUTPATIENT
Start: 2023-12-07 | End: 2023-12-08 | Stop reason: HOSPADM

## 2023-12-07 RX ORDER — MECOBALAMIN 5000 MCG
5 TABLET,DISINTEGRATING ORAL NIGHTLY PRN
Status: DISCONTINUED | OUTPATIENT
Start: 2023-12-07 | End: 2023-12-08 | Stop reason: HOSPADM

## 2023-12-07 RX ADMIN — IOPAMIDOL 70 ML: 755 INJECTION, SOLUTION INTRAVENOUS at 18:21

## 2023-12-07 RX ADMIN — SODIUM CHLORIDE 1000 ML: 9 INJECTION, SOLUTION INTRAVENOUS at 20:30

## 2023-12-07 ASSESSMENT — ENCOUNTER SYMPTOMS
VOMITING: 0
BACK PAIN: 1
SHORTNESS OF BREATH: 0
DIARRHEA: 0
EYE PAIN: 0
ABDOMINAL PAIN: 0

## 2023-12-07 NOTE — ED PROVIDER NOTES
legs.  Neurovascular intact distally all 4 extremities. Lymphadenopathy:      Cervical: No cervical adenopathy. Skin:     General: Skin is warm and dry. Capillary Refill: Capillary refill takes less than 2 seconds. Neurological:      General: No focal deficit present. Mental Status: He is alert and oriented to person, place, and time. GCS: GCS eye subscore is 4. GCS verbal subscore is 5. GCS motor subscore is 6. Cranial Nerves: Cranial nerves 2-12 are intact. Sensory: Sensation is intact. Motor: Motor function is intact. Psychiatric:         Mood and Affect: Mood normal.         Behavior: Behavior normal.         DIAGNOSTIC RESULTS     EKG: All EKG's are interpreted by the Emergency Department Physician who either signs or Co-signs this chart in the absence of a cardiologist.    Normal sinus rhythm. Normal QT. ST elevation in several leads that overall looks similar to EKG from 3/2/18 other ST segment may be slightly more elevated today when compared to prior    RADIOLOGY:   Non-plain film images such as CT, Ultrasound and MRI are read by the radiologist. Kali Valentine images are visualized and preliminarily interpreted by the emergency physician with the below findings:        Interpretation per the Radiologist below, if available at the time of this note:    [address]    Final Result       - No large vessel occlusion, dissection, or aneurysm. - Bilateral posterior fetal type circulation. .        All CT scans are performed using dose optimization techniques as appropriate to the performed exam and include    at least one of the following: Automated exposure control, adjustment of the mA and/or kV according to size, and the use of iterative reconstruction technique. ______________________________________    Electronically signed by: Jimena Nathan D.O.    Date:     12/07/2023   Time:    21:04       CTA PULMONARY W CONTRAST

## 2023-12-08 VITALS
SYSTOLIC BLOOD PRESSURE: 124 MMHG | DIASTOLIC BLOOD PRESSURE: 76 MMHG | WEIGHT: 176 LBS | HEART RATE: 100 BPM | HEIGHT: 70 IN | RESPIRATION RATE: 16 BRPM | TEMPERATURE: 97.7 F | OXYGEN SATURATION: 96 % | BODY MASS INDEX: 25.2 KG/M2

## 2023-12-08 PROBLEM — R55 SYNCOPE AND COLLAPSE: Status: ACTIVE | Noted: 2023-12-08

## 2023-12-08 LAB
ANION GAP SERPL CALCULATED.3IONS-SCNC: 9 MMOL/L (ref 7–19)
BASOPHILS # BLD: 0.1 K/UL (ref 0–0.2)
BASOPHILS NFR BLD: 0.5 % (ref 0–1)
BUN SERPL-MCNC: 15 MG/DL (ref 6–20)
CALCIUM SERPL-MCNC: 8.9 MG/DL (ref 8.6–10)
CHLORIDE SERPL-SCNC: 108 MMOL/L (ref 98–111)
CO2 SERPL-SCNC: 24 MMOL/L (ref 22–29)
CREAT SERPL-MCNC: 0.8 MG/DL (ref 0.5–1.2)
EOSINOPHIL # BLD: 0.2 K/UL (ref 0–0.6)
EOSINOPHIL NFR BLD: 1.7 % (ref 0–5)
ERYTHROCYTE [DISTWIDTH] IN BLOOD BY AUTOMATED COUNT: 12.6 % (ref 11.5–14.5)
GLUCOSE SERPL-MCNC: 101 MG/DL (ref 74–109)
HCT VFR BLD AUTO: 45.1 % (ref 42–52)
HGB BLD-MCNC: 15.4 G/DL (ref 14–18)
IMM GRANULOCYTES # BLD: 0 K/UL
LYMPHOCYTES # BLD: 2.5 K/UL (ref 1.1–4.5)
LYMPHOCYTES NFR BLD: 25 % (ref 20–40)
MCH RBC QN AUTO: 31.8 PG (ref 27–31)
MCHC RBC AUTO-ENTMCNC: 34.1 G/DL (ref 33–37)
MCV RBC AUTO: 93 FL (ref 80–94)
MONOCYTES # BLD: 0.9 K/UL (ref 0–0.9)
MONOCYTES NFR BLD: 9.1 % (ref 0–10)
NEUTROPHILS # BLD: 6.4 K/UL (ref 1.5–7.5)
NEUTS SEG NFR BLD: 63.3 % (ref 50–65)
PLATELET # BLD AUTO: 249 K/UL (ref 130–400)
PMV BLD AUTO: 9.7 FL (ref 9.4–12.4)
POTASSIUM SERPL-SCNC: 3.9 MMOL/L (ref 3.5–5)
RBC # BLD AUTO: 4.85 M/UL (ref 4.7–6.1)
SODIUM SERPL-SCNC: 141 MMOL/L (ref 136–145)
WBC # BLD AUTO: 10.1 K/UL (ref 4.8–10.8)

## 2023-12-08 PROCEDURE — 93005 ELECTROCARDIOGRAM TRACING: CPT | Performed by: HOSPITALIST

## 2023-12-08 PROCEDURE — 80048 BASIC METABOLIC PNL TOTAL CA: CPT

## 2023-12-08 PROCEDURE — 6370000000 HC RX 637 (ALT 250 FOR IP): Performed by: HOSPITALIST

## 2023-12-08 PROCEDURE — C8929 TTE W OR WO FOL WCON,DOPPLER: HCPCS

## 2023-12-08 PROCEDURE — 2580000003 HC RX 258: Performed by: HOSPITALIST

## 2023-12-08 PROCEDURE — 6360000004 HC RX CONTRAST MEDICATION: Performed by: HOSPITALIST

## 2023-12-08 PROCEDURE — G0378 HOSPITAL OBSERVATION PER HR: HCPCS

## 2023-12-08 PROCEDURE — 94760 N-INVAS EAR/PLS OXIMETRY 1: CPT

## 2023-12-08 PROCEDURE — 36415 COLL VENOUS BLD VENIPUNCTURE: CPT

## 2023-12-08 PROCEDURE — 6360000002 HC RX W HCPCS: Performed by: HOSPITALIST

## 2023-12-08 PROCEDURE — 93246 EXT ECG>7D<15D RECORDING: CPT

## 2023-12-08 PROCEDURE — 85025 COMPLETE CBC W/AUTO DIFF WBC: CPT

## 2023-12-08 RX ADMIN — SODIUM CHLORIDE, PRESERVATIVE FREE 10 ML: 5 INJECTION INTRAVENOUS at 09:02

## 2023-12-08 RX ADMIN — PERFLUTREN 1.5 ML: 6.52 INJECTION, SUSPENSION INTRAVENOUS at 07:39

## 2023-12-08 RX ADMIN — ENOXAPARIN SODIUM 40 MG: 100 INJECTION SUBCUTANEOUS at 09:01

## 2023-12-08 RX ADMIN — TAMSULOSIN HYDROCHLORIDE 0.4 MG: 0.4 CAPSULE ORAL at 09:01

## 2023-12-08 ASSESSMENT — ENCOUNTER SYMPTOMS
NAUSEA: 0
EYES NEGATIVE: 1
COUGH: 0
GASTROINTESTINAL NEGATIVE: 1
PHOTOPHOBIA: 0
BACK PAIN: 0
DIARRHEA: 0
RESPIRATORY NEGATIVE: 1
SHORTNESS OF BREATH: 0
VOMITING: 0

## 2023-12-08 NOTE — PROGRESS NOTES
Pt has been up ambulating in room. No episodes of syncope or dizziness noted. Pt has denied and pain or discomfort. Pt was instructed on discharge medications, follow up appts. ZIO patch placed and pt instructed on ZIO patch. Pt has been given a copy of discharge. Pt also instructed to  call PCP on Monday and let then decide if EEG still needed.  Electronically signed by Ba Rubio RN on 12/8/2023 at 4:48 PM

## 2023-12-08 NOTE — DISCHARGE SUMMARY
Irma Perez  :  1985  MRN:  [id number]    Admit date:  2023 Discharge date:  2023    Discharging Physician:  Dr Laura Alpers Directive: Full Code    Consults: IP CONSULT TO NEUROLOGY  IP CONSULT TO CARDIOLOGY  IP CONSULT TO VASCULAR SURGERY     Primary Care Physician:  Tyra Baker, APRN - CNP    Discharge Diagnoses:  Principal Problem:    Syncope  Active Problems:    Syncope and collapse  Resolved Problems:    * No resolved hospital problems. *      Portions of this note have been copied forward, however, changed to reflect the most current clinical status of this patient. Hospital Course:   Patient is a 35-year-old male who presented to the [facility] ED for evaluation of syncope patient has a history of ADHD. Patient reports he lost consciousness prior to admission similar to prior episodes that he also occurred in the bathroom while having his head tilted back in the umana chair. Patient reported he may have had a seizure by his umana, denies biting his tongue or incontinence, denies falling from chair, relates that episode lasted approximately 6 minutes. Denies a history of seizures. Denies chest pain or shortness of breath. Valuated in the ED, CT of the head noted no acute intracranial processes, CTA pulmonary noted no acute cardiopulmonary disease. 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 Patient evaluated per Cardiology, will discharge with ZIO patch. Orthostatic negative. Can follow up out patient in 6 weeks. Neurology ok to follow up with outpatient EEG and work up. Patient stable for discharge. Follow up with PCP for medical management.     Significant Diagnostic Studies:   CTA HEAD NECK W CONTRAST    Result Date: 2023  EXAM:  CTA HEAD AND NECK WITH CONTRAST  HISTORY:  Syncope  COMPARISON:  CT head from 2023  TECHNIQUE:  Multi-slice pre and postcontrast transaxial helical images

## 2023-12-08 NOTE — CONSULTS
[facility] Vascular Surgery    CONSULT    Patient:  Beverly Gallo  YOB: 1985  Date of Service: 12/8/2023  MRN: [id number]   Acct: [de-identified]   Primary Care Physician: SHADE Álvarez - CNP    Reason for consult: Syncope    Requesting Physician: Jonathan Dorman MD    History Obtained From: Patient and chart review    HISTORY OF PRESENT ILLNESS:    Mr. Beverly Gallo is a 45 y.o. male who presented to [facility] ED after a syncopal episode. 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 He became lightheaded and passed out when he was at the umana and had his head tilted back looking up. Pt states his umana said he was out for about a minute. Pt denies any visual changes, auditory changes, irregular heart rate/rhythm, tingling, numbness or weakness. He admits to having episodes like this previously, though it has been close to 2 years ago, at the dentist and eye doctor when his head was tilted back looking up. In ED pt VSS, EKG normal sinus, cbc and cmp normal. CTA negative for PE, CTA Head and neck show no occlusion, dissection or aneurysm of any large vessels, CT Head showed no acute findings, and CT abdomen showed questionable hematoma left posterior bladder. Pt admitted to hospital medicine with cardiology and neurology consultations for further monitoring and workup for syncope. Vascular surgery consulted for further recommendations. Past Medical History:   History reviewed. No pertinent past medical history. Past Surgical History:        Procedure Laterality Date    SHOULDER SURGERY         Allergies:  Patient has no known allergies.     Medications Current:   Current Facility-Administered Medications   Medication Dose Route Frequency Provider Last Rate Last Admin    sodium chloride flush 0.9 % injection 5-40 mL  5-40 mL IntraVENous 2 times per day Akash Brown MD   10 mL at 12/08/23 9783
[facility]  Cardiology Consult      Requesting MD:  Joe Jordan MD   Admit Status:         History obtained from:   [] Patient  [] Other (specify):     Patient:  Alfred Macias  [id number]     Chief Complaint:   Chief Complaint   Patient presents with    Loss of Consciousness    Bradycardia         HPI: Mr. Maira Clemens is a 45 y.o. male with no prior cardiac history had a syncopal episode yesterday while in the umana chair about 4 PM prompting admission he had another episode similar to this 2 years before and has this may be his third or fourth episode and yesterday's episode was perhaps the only 1 associated with complete loss of consciousness. There was no reported injuries. He did not take his normal Adderall dose yesterday. Slightly nauseated felt claustrophobic lightheaded not aware of any palpitations denies chest pain or dyspnea. No prior cardiac history or workup. Echocardiogram since admission normal left ventricular function no significant abnormalities. No reported arrhythmias on monitoring. No new medication changes. Cardiology consulted. Review of Systems:  Review of Systems   Constitutional: Negative. Negative for chills, fever and unexpected weight change. HENT: Negative. Eyes: Negative. Respiratory: Negative. Negative for shortness of breath. Cardiovascular: Negative. Negative for chest pain. Gastrointestinal: Negative. Negative for diarrhea, nausea and vomiting. Endocrine: Negative. Genitourinary: Negative. Musculoskeletal: Negative. Skin: Negative. Neurological: Negative. All other systems reviewed and are negative. Cardiac Specific Data:  Specialty Problems    None      Past Medical History:  History reviewed. No pertinent past medical history.      Past Surgical History:  Past Surgical History:   Procedure Laterality Date    SHOULDER SURGERY         Past Family History:  Family History   Problem Relation Age of Onset
IV, VI   Pupils are equal, round, and reactive to light. Extraocular motions are normal.     CN VII   Facial expression full, symmetric.      CN VIII   Hearing: intact    CN IX, X   Palate: symmetric    CN XI   Right sternocleidomastoid strength: normal  Left sternocleidomastoid strength: normal  Right trapezius strength: normal  Left trapezius strength: normal    CN XII   Tongue: not atrophic  Fasciculations: absent  Tongue deviation: none    Motor Exam   Muscle bulk: normal  Overall muscle tone: normal  Right arm pronator drift: absent  Left arm pronator drift: absent    Strength   Right neck flexion: 5/5  Left neck flexion: 5/5  Right neck extension: 5/5  Left neck extension: 5/5  Right deltoid: 5/5  Left deltoid: 5/5  Right biceps: 5/5  Left biceps: 5/5  Right triceps: 5/5  Left triceps: 5/5  Right wrist flexion: 5/5  Left wrist flexion: 5/5  Right wrist extension: 5/5  Left wrist extension: 5/5  Right interossei: 5/5  Left interossei: 5/5  Right iliopsoas: 5/5  Left iliopsoas: 5/5  Right quadriceps: 5/5  Left quadriceps: 5/5  Right glutei: 5/5  Left glutei: 5/5  Right anterior tibial: 5/5  Left anterior tibial: 5/5  Right posterior tibial: 5/5  Left posterior tibial: 5/5  Right peroneal: 5/5  Left peroneal: 5/5  Right gastroc: 5/5  Left gastroc: 5/5    Sensory Exam   Light touch normal.   Vibration normal.     Gait, Coordination, and Reflexes     Coordination   Finger to nose coordination: normal  Heel to shin coordination: normal    Tremor   Resting tremor: absent  Intention tremor: absent  Action tremor: absent    Reflexes   Right brachioradialis: 2+  Left brachioradialis: 2+  Right biceps: 2+  Left biceps: 2+  Right triceps: 2+  Left triceps: 2+  Right patellar: 2+  Left patellar: 2+  Right achilles: 2+  Left achilles: 2+  Right plantar: normal  Left plantar: normal  Right Matthews: absent  Left Matthews: absent  Right ankle clonus: absent  Left ankle clonus: absent  Rapid alternating movements were normal.

## 2023-12-09 LAB
EKG P AXIS: -68 DEGREES
EKG P-R INTERVAL: 154 MS
EKG Q-T INTERVAL: 418 MS
EKG QRS DURATION: 100 MS
EKG QTC CALCULATION (BAZETT): 415 MS
EKG T AXIS: 53 DEGREES

## 2023-12-11 LAB
EKG P AXIS: 23 DEGREES
EKG P-R INTERVAL: 154 MS
EKG Q-T INTERVAL: 432 MS
EKG QRS DURATION: 100 MS
EKG QTC CALCULATION (BAZETT): 424 MS
EKG T AXIS: 52 DEGREES

## 2023-12-28 DIAGNOSIS — F90.2 ATTENTION DEFICIT HYPERACTIVITY DISORDER (ADHD), COMBINED TYPE: ICD-10-CM

## 2023-12-28 DIAGNOSIS — F51.01 PRIMARY INSOMNIA: ICD-10-CM

## 2023-12-28 RX ORDER — DEXTROAMPHETAMINE SACCHARATE, AMPHETAMINE ASPARTATE, DEXTROAMPHETAMINE SULFATE AND AMPHETAMINE SULFATE 2.5; 2.5; 2.5; 2.5 MG/1; MG/1; MG/1; MG/1
10 TABLET ORAL DAILY
Qty: 30 TABLET | Refills: 0 | Status: SHIPPED | OUTPATIENT
Start: 2023-12-28

## 2023-12-28 RX ORDER — TRAZODONE HYDROCHLORIDE 50 MG/1
50 TABLET ORAL NIGHTLY PRN
Qty: 30 TABLET | Refills: 5 | Status: SHIPPED | OUTPATIENT
Start: 2023-12-28

## 2023-12-28 RX ORDER — DEXTROAMPHETAMINE SACCHARATE, AMPHETAMINE ASPARTATE MONOHYDRATE, DEXTROAMPHETAMINE SULFATE AND AMPHETAMINE SULFATE 7.5; 7.5; 7.5; 7.5 MG/1; MG/1; MG/1; MG/1
30 CAPSULE, EXTENDED RELEASE ORAL DAILY
Qty: 30 CAPSULE | Refills: 0 | Status: SHIPPED | OUTPATIENT
Start: 2023-12-28

## 2023-12-28 NOTE — TELEPHONE ENCOUNTER
Caller: Tej Rod ALDO    Relationship: Self    Best call back number: [phone]     Requested Prescriptions:   Requested Prescriptions     Pending Prescriptions Disp Refills    amphetamine-dextroamphetamine XR (Adderall XR) 30 MG 24 hr capsule 30 capsule 0     Sig: Take 1 capsule by mouth Daily    amphetamine-dextroamphetamine (ADDERALL) 10 MG tablet 30 tablet 0     Sig: Take 1 tablet by mouth Daily.    traZODone (DESYREL) 50 MG tablet 30 tablet 5     Sig: Take 1 tablet by mouth At Night As Needed for Sleep.        Pharmacy where request should be sent:  [facility]    Last office visit with prescribing clinician: 8/10/2023   Last telemedicine visit with prescribing clinician: Visit date not found   Next office visit with prescribing clinician: 2/9/2024     Additional details provided by patient:     Does the patient have less than a 3 day supply:  [x] Yes  [] No    Would you like a call back once the refill request has been completed: [] Yes [x] No    If the office needs to give you a call back, can they leave a voicemail: [] Yes [x] No    Odell Aguila Rep   12/28/23 10:50 CST

## 2023-12-28 NOTE — TELEPHONE ENCOUNTER
[place]: [place]NL    Per protocol last refill: 11/28/2023    Rx Refill Note  Requested Prescriptions     Pending Prescriptions Disp Refills    amphetamine-dextroamphetamine XR (Adderall XR) 30 MG 24 hr capsule 30 capsule 0     Sig: Take 1 capsule by mouth Daily    amphetamine-dextroamphetamine (ADDERALL) 10 MG tablet 30 tablet 0     Sig: Take 1 tablet by mouth Daily.    traZODone (DESYREL) 50 MG tablet 30 tablet 5     Sig: Take 1 tablet by mouth At Night As Needed for Sleep.      Last office visit with prescribing clinician: 8/10/2023   Last telemedicine visit with prescribing clinician: Visit date not found   Next office visit with prescribing clinician: 2/9/2024                         Would you like a call back once the refill request has been completed: [] Yes [] No    If the office needs to give you a call back, can they leave a voicemail: [] Yes [] No    Odell Boone Rep  12/28/23, 11:01 CST

## 2024-01-15 ENCOUNTER — OFFICE VISIT (OUTPATIENT)
Dept: CARDIOLOGY | Facility: CLINIC | Age: 39
End: 2024-01-15
Payer: COMMERCIAL

## 2024-01-15 VITALS
OXYGEN SATURATION: 99 % | HEIGHT: 70 IN | BODY MASS INDEX: 26.2 KG/M2 | SYSTOLIC BLOOD PRESSURE: 110 MMHG | DIASTOLIC BLOOD PRESSURE: 78 MMHG | HEART RATE: 87 BPM | WEIGHT: 183 LBS

## 2024-01-15 DIAGNOSIS — R55 SYNCOPE AND COLLAPSE: Primary | ICD-10-CM

## 2024-01-15 DIAGNOSIS — I65.21 CAROTID ARTERY STENOSIS, SYMPTOMATIC, RIGHT: ICD-10-CM

## 2024-01-15 DIAGNOSIS — R09.89 BRUIT: ICD-10-CM

## 2024-01-15 PROCEDURE — 99204 OFFICE O/P NEW MOD 45 MIN: CPT | Performed by: EMERGENCY MEDICINE

## 2024-01-15 NOTE — PROGRESS NOTES
[facility] - CARDIOLOGY  New Patient Initial Outpatient Evaulation    Primary Care Physician: Gutierrez Miller, PUMA    Subjective     Chief Complaint   Patient presents with    Syncope     NEW PT         History of Present Illness    Rod Burnham is a 38-year-old male patient who presents to the clinic to establish care for evaluation of syncope.     The patient reports that he was sitting in a anthony chair and passed out and went to the emergency room at [facility]. According to hospital records he lost consciousness prior to admission; he lost consciousness while having his head tilted back in the anthony chair. He has had prior similar episodes. The patient reported the anthony thought he may have had a seizure. He denied biting his tongue or incontinence. He denied falling from the chair. The episode lasted 6 minutes. He has no history of seizures. He denied chest pain or shortness of breath. He was evaluated in the ED. CT of the head noted no significant findings. A CTA pulmonary noted no acute disease. He was evaluated by cardiology and was discharged with a Zio patch. He confirms wearing the ZIO monitor. and the results were read by Dr. Grimes.     This was the first time he actually lost consciousness, but there have been other similar incidents over the course of 8 years. He had 3 episodes while getting his beard trim and having his head tilted back. He had an incident at the eye doctor's office while he had his head straight up looking at the ceiling. The first 3 instances, he did not actually pass out, but he got sweaty and felt like he was about to pass out. This time when he was taken to the emergency room, he got lightheaded and lost all the color in his face.     He mentions having had right shoulder surgery when he was in college approximately 13 years ago. His symptoms started after he had his surgery.     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 He had a kidney stone  several years back and they did an x-ray on his chest and that is when they saw it for the first time. They started watching it and while having another kidney stone the emergency room doctor said it had not grown any so he assumes it is not a problem. He has always had low blood pressure.        Review of Systems   Constitutional: Negative for diaphoresis, fever and malaise/fatigue.   HENT:  Negative for congestion.    Eyes:  Negative for vision loss in left eye and vision loss in right eye.   Cardiovascular:  Positive for syncope. Negative for chest pain, claudication, dyspnea on exertion, irregular heartbeat, leg swelling, orthopnea and palpitations.   Respiratory:  Negative for cough, shortness of breath and wheezing.    Hematologic/Lymphatic: Negative for adenopathy.   Skin:  Negative for rash.   Musculoskeletal:  Negative for joint pain and joint swelling.   Gastrointestinal:  Negative for abdominal pain, diarrhea, nausea and vomiting.   Neurological:  Negative for excessive daytime sleepiness, dizziness, focal weakness, light-headedness, numbness and weakness.   Psychiatric/Behavioral:  Negative for depression. The patient does not have insomnia.         Otherwise complete ROS reviewed and negative except as mentioned in the HPI.      Past Medical History:   Past Medical History:   Diagnosis Date    ADHD (attention deficit hyperactivity disorder)        Past Surgical History:  Past Surgical History:   Procedure Laterality Date    SHOULDER SURGERY Right        Family History: family history includes Breast cancer in his maternal grandmother; Breast cancer (age of onset: 53) in his mother; Melanoma in his father.    Social History:  reports that he has never smoked. He has never used smokeless tobacco. He reports that he does not currently use drugs after having used the following drugs: Marijuana. He reports that he does not drink alcohol.    Medications:  Prior to Admission medications    Medication Sig Start  "Date End Date Taking? Authorizing Provider   amphetamine-dextroamphetamine (ADDERALL) 10 MG tablet Take 1 tablet by mouth Daily. 12/28/23  Yes Cesar Sandoval MD   amphetamine-dextroamphetamine XR (Adderall XR) 30 MG 24 hr capsule Take 1 capsule by mouth Daily 12/28/23  Yes Cesar Sandoval MD   SUMAtriptan (IMITREX) 50 MG tablet Take 1 tablet by mouth Daily As Needed for Headache. May repeat in 2 hours if needed.   Yes ProviderIrma MD   traZODone (DESYREL) 50 MG tablet Take 1 tablet by mouth At Night As Needed for Sleep. 12/28/23  Yes Cesar Sandoval MD     Allergies:  No Known Allergies    Objective     Vital Signs: /78   Pulse 87   Ht 177.8 cm (70\")   Wt 83 kg (183 lb)   SpO2 99%   BMI 26.26 kg/m²     Vitals and nursing note reviewed.   Constitutional:       Appearance: Normal and healthy appearance. Well-developed and not in distress.   Eyes:      Extraocular Movements: Extraocular movements intact.      Pupils: Pupils are equal, round, and reactive to light.   HENT:      Head: Normocephalic and atraumatic.    Mouth/Throat:      Pharynx: Oropharynx is clear.   Neck:      Vascular: JVD normal.      Trachea: Trachea normal.   Pulmonary:      Effort: Pulmonary effort is normal.      Breath sounds: Normal breath sounds. No wheezing. No rhonchi. No rales.   Cardiovascular:      PMI at left midclavicular line. Normal rate. Regular rhythm. Normal S1. Normal S2.       Murmurs: There is no murmur.      No gallop.  No click. No rub.   Pulses:     Dorsalis pedis: 2+ bilaterally.     Posterior tibial: 2+ bilaterally.  Abdominal:      General: Bowel sounds are normal.      Palpations: Abdomen is soft.      Tenderness: There is no abdominal tenderness.   Musculoskeletal: Normal range of motion.      Cervical back: Normal range of motion and neck supple. Skin:     General: Skin is warm and dry.      Capillary Refill: Capillary refill takes less than 2 seconds.   Feet:      Right foot:      " Skin integrity: Skin integrity normal.      Left foot:      Skin integrity: Skin integrity normal.   Neurological:      Mental Status: Alert and oriented to person, place and time.      Sensory: Sensation is intact.      Motor: Motor function is intact.      Coordination: Coordination is intact.   Psychiatric:         Speech: Speech normal.         Behavior: Behavior is cooperative.         Results Reviewed:    ZIO cardiac event monitor worn from 12/08/2023 to 12/22/2023 and documented by Dr. Grimes was reviewed. Sinus rhythm, minimal heart rate 42 beats per minute, average 80 beats per minute, maximum was 173 beats per minute. No significant ventricular ectopy. No significant supraventricular ectopy.  Bradycardia burden 16 percent, tachycardia burden 16 percent.  No atrial fibrillation, no pauses, 4 episodes of supraventricular tachycardia (SVT), and 1 patient activated event noted with sinus arrhythmias and PACs.     CT angiogram head and neck performed on 12/07/2023 was reviewed. The basilar artery is patent. The left A1 segment of the anterior cerebral circulation is hypoplastic but patent. The right A1 and bilateral A2 segments are widely patent. The anterior communicating artery is patent. The bilateral middle cerebral arteries are widely patent with normal caliber. Bilateral posterior communicating arteries are noted. The posterior cerebral arteries are patent. The venous structures enhance normally.  No dissections or large vessel occlusion.         ECG 12 Lead    Date/Time: 1/16/2024 11:09 AM  Performed by: Orion Alva DO    Authorized by: Orion Alva DO  Previous ECG: no previous ECG available  Rhythm: sinus arrhythmia  Rate: normal  Conduction: conduction normal  QRS axis: normal  Other findings: non-specific ST-T wave changes    Clinical impression: abnormal EKG        ECG 12-lead performed in-office today, 01/15/2024.      Lab Results   Component Value Date    TRIG 55  01/23/2023    HDL 52 01/23/2023    VLDL 12 01/23/2023     Lab Results   Component Value Date    HGBA1C 5.3 04/15/2016       Assessment / Plan        Problem List Items Addressed This Visit    None  Visit Diagnoses       Syncope and collapse    -  Primary    Relevant Orders    Tilt Table    Carotid artery stenosis, symptomatic, right        Relevant Orders    CT angiogram neck w wo contrast    Bruit        Relevant Orders    US Carotid Bilateral            1. Syncope and collapse  2. Carotid artery stenosis, symptomatic, right  3. Bruit    The patient presents today with a history of near-syncope with a recent episode of syncope with collapse. An order has been placed for a tilt table test. An order has been placed for an ultrasound of bilateral carotids.    Patient will follow up     Transcribed from ambient dictation for Orion Alva DO by Mily Yoo.  01/15/24   12:23 CST    Patient or patient representative verbalized consent to the visit recording.  I have personally performed the services described in this document as transcribed by the above individual, and it is both accurate and complete.  Orion Alva DO  1/16/2024  11:11 CST

## 2024-01-16 PROCEDURE — 93000 ELECTROCARDIOGRAM COMPLETE: CPT | Performed by: EMERGENCY MEDICINE

## 2024-01-29 DIAGNOSIS — F90.2 ATTENTION DEFICIT HYPERACTIVITY DISORDER (ADHD), COMBINED TYPE: ICD-10-CM

## 2024-01-29 DIAGNOSIS — F51.01 PRIMARY INSOMNIA: ICD-10-CM

## 2024-01-29 RX ORDER — DEXTROAMPHETAMINE SACCHARATE, AMPHETAMINE ASPARTATE, DEXTROAMPHETAMINE SULFATE AND AMPHETAMINE SULFATE 2.5; 2.5; 2.5; 2.5 MG/1; MG/1; MG/1; MG/1
10 TABLET ORAL DAILY
Qty: 30 TABLET | Refills: 0 | Status: SHIPPED | OUTPATIENT
Start: 2024-01-29

## 2024-01-29 RX ORDER — DEXTROAMPHETAMINE SACCHARATE, AMPHETAMINE ASPARTATE MONOHYDRATE, DEXTROAMPHETAMINE SULFATE AND AMPHETAMINE SULFATE 7.5; 7.5; 7.5; 7.5 MG/1; MG/1; MG/1; MG/1
30 CAPSULE, EXTENDED RELEASE ORAL DAILY
Qty: 30 CAPSULE | Refills: 0 | Status: SHIPPED | OUTPATIENT
Start: 2024-01-29

## 2024-01-29 RX ORDER — TRAZODONE HYDROCHLORIDE 50 MG/1
50 TABLET ORAL NIGHTLY PRN
Qty: 30 TABLET | Refills: 5 | Status: SHIPPED | OUTPATIENT
Start: 2024-01-29

## 2024-01-29 NOTE — TELEPHONE ENCOUNTER
Caller: Rod Burnham    Relationship: Self    Best call back number: [phone]     Requested Prescriptions:   Requested Prescriptions     Pending Prescriptions Disp Refills    amphetamine-dextroamphetamine XR (Adderall XR) 30 MG 24 hr capsule 30 capsule 0     Sig: Take 1 capsule by mouth Daily    amphetamine-dextroamphetamine (ADDERALL) 10 MG tablet 30 tablet 0     Sig: Take 1 tablet by mouth Daily.    traZODone (DESYREL) 50 MG tablet 30 tablet 5     Sig: Take 1 tablet by mouth At Night As Needed for Sleep.        [place] [facility] [place] [address] [facility] [phone] [facility] [phone]      Last office visit with prescribing clinician: Visit date not found   Last telemedicine visit with prescribing clinician: Visit date not found   Next office visit with prescribing clinician: Visit date not found     Additional details provided by patient:     Does the patient have less than a 3 day supply:  [x] Yes  [] No        Greg Solomon III, Odell Rep   01/29/24 12:45 CST

## 2024-02-01 ENCOUNTER — HOSPITAL ENCOUNTER (OUTPATIENT)
Dept: CARDIOLOGY | Facility: HOSPITAL | Age: 39
Discharge: HOME | End: 2024-02-01
Payer: COMMERCIAL

## 2024-02-01 ENCOUNTER — HOSPITAL ENCOUNTER (OUTPATIENT)
Dept: ULTRASOUND IMAGING | Facility: HOSPITAL | Age: 39
Discharge: HOME | End: 2024-02-01
Admitting: EMERGENCY MEDICINE
Payer: COMMERCIAL

## 2024-02-01 DIAGNOSIS — R09.89 BRUIT: ICD-10-CM

## 2024-02-01 DIAGNOSIS — R55 SYNCOPE AND COLLAPSE: ICD-10-CM

## 2024-02-01 PROCEDURE — 93880 EXTRACRANIAL BILAT STUDY: CPT

## 2024-02-01 PROCEDURE — 93660 TILT TABLE EVALUATION: CPT

## 2024-02-01 RX ORDER — NITROGLYCERIN 0.4 MG/1
0.4 TABLET SUBLINGUAL ONCE
Status: COMPLETED | OUTPATIENT
Start: 2024-02-01 | End: 2024-02-01

## 2024-02-01 RX ADMIN — NITROGLYCERIN 0.4 MG: 0.4 TABLET SUBLINGUAL at 13:36

## 2024-02-02 LAB — BH CV TILT AGENT USED: NORMAL

## 2024-02-09 ENCOUNTER — HOSPITAL ENCOUNTER (OUTPATIENT)
Dept: CT IMAGING | Facility: HOSPITAL | Age: 39
Discharge: HOME | End: 2024-02-09
Payer: COMMERCIAL

## 2024-02-09 ENCOUNTER — OFFICE VISIT (OUTPATIENT)
Dept: FAMILY MEDICINE CLINIC | Facility: CLINIC | Age: 39
End: 2024-02-09
Payer: COMMERCIAL

## 2024-02-09 ENCOUNTER — APPOINTMENT (OUTPATIENT)
Dept: OTHER | Facility: HOSPITAL | Age: 39
End: 2024-02-09
Payer: COMMERCIAL

## 2024-02-09 VITALS
SYSTOLIC BLOOD PRESSURE: 120 MMHG | OXYGEN SATURATION: 98 % | TEMPERATURE: 96.9 F | WEIGHT: 187.2 LBS | BODY MASS INDEX: 26.8 KG/M2 | RESPIRATION RATE: 18 BRPM | HEART RATE: 61 BPM | DIASTOLIC BLOOD PRESSURE: 80 MMHG | HEIGHT: 70 IN

## 2024-02-09 DIAGNOSIS — I65.21 CAROTID ARTERY STENOSIS, SYMPTOMATIC, RIGHT: ICD-10-CM

## 2024-02-09 DIAGNOSIS — F90.2 ATTENTION DEFICIT HYPERACTIVITY DISORDER (ADHD), COMBINED TYPE: Primary | ICD-10-CM

## 2024-02-09 DIAGNOSIS — R55 SYNCOPE, UNSPECIFIED SYNCOPE TYPE: ICD-10-CM

## 2024-02-09 LAB — CREAT BLDA-MCNC: 1.2 MG/DL (ref 0.6–1.3)

## 2024-02-09 PROCEDURE — 25510000001 IOPAMIDOL PER 1 ML: Performed by: EMERGENCY MEDICINE

## 2024-02-09 PROCEDURE — 82565 ASSAY OF CREATININE: CPT

## 2024-02-09 PROCEDURE — 99213 OFFICE O/P EST LOW 20 MIN: CPT | Performed by: NURSE PRACTITIONER

## 2024-02-09 PROCEDURE — 70498 CT ANGIOGRAPHY NECK: CPT

## 2024-02-09 RX ADMIN — IOPAMIDOL 100 ML: 755 INJECTION, SOLUTION INTRAVENOUS at 09:16

## 2024-02-22 ENCOUNTER — OFFICE VISIT (OUTPATIENT)
Dept: CARDIOLOGY | Facility: CLINIC | Age: 39
End: 2024-02-22
Payer: COMMERCIAL

## 2024-02-22 VITALS
HEIGHT: 70 IN | BODY MASS INDEX: 26.05 KG/M2 | OXYGEN SATURATION: 97 % | WEIGHT: 182 LBS | HEART RATE: 113 BPM | SYSTOLIC BLOOD PRESSURE: 132 MMHG | DIASTOLIC BLOOD PRESSURE: 70 MMHG

## 2024-02-22 DIAGNOSIS — R55 SYNCOPE AND COLLAPSE: Primary | ICD-10-CM

## 2024-02-22 PROCEDURE — 99213 OFFICE O/P EST LOW 20 MIN: CPT | Performed by: EMERGENCY MEDICINE

## 2024-02-22 RX ORDER — MIDODRINE HYDROCHLORIDE 10 MG/1
10 TABLET ORAL
Qty: 90 TABLET | Refills: 3 | Status: SHIPPED | OUTPATIENT
Start: 2024-02-22

## 2024-02-22 NOTE — PROGRESS NOTES
Subjective:     Encounter Date:02/22/2024      Patient ID: Rod Burnham is a 38 y.o. male.    Chief Complaint:  History of Present Illness    Rod Burnham is a 38-year-old male who presents for follow-up.    The patient reports he has been doing pretty good. He underwent a carotid ultrasound and tilt test. He remembers passing out during the tilt table test.  He has only had one other incident since his last visit. Yesterday, he was eating at a restaurant with his mother for breakfast. They were sitting in a henry and a whole table full of people came and sat right behind them. As soon as they sat down, he started feeling like he was going to pass out, having a sudden feeling of being hot. He felt confined, but as soon as he stood up and walked off, he felt fine. He notes that every time he has had a syncopal episode it has been where someone has been in really close proximity to him. He questions the possibility of claustrophobia.     He admits that he does not drink enough liquid throughout the day. He does not add salt to his food.     He mentions that he handles stress very well.     The patient is currently taking Adderall, Imitrex, as well as trazodone at night as needed.        Review of Systems   Constitutional: Negative for diaphoresis, fever and malaise/fatigue.   HENT:  Negative for congestion.    Eyes:  Negative for vision loss in left eye and vision loss in right eye.   Cardiovascular:  Positive for syncope. Negative for chest pain, claudication, dyspnea on exertion, irregular heartbeat, leg swelling, orthopnea and palpitations.   Respiratory:  Negative for cough, shortness of breath and wheezing.    Hematologic/Lymphatic: Negative for adenopathy.   Skin:  Negative for rash.   Musculoskeletal:  Negative for joint pain and joint swelling.   Gastrointestinal:  Negative for abdominal pain, diarrhea, nausea and vomiting.   Neurological:  Negative for excessive daytime sleepiness, dizziness, focal  weakness, light-headedness, numbness and weakness.   Psychiatric/Behavioral:  Negative for depression. The patient does not have insomnia.            Current Outpatient Medications:     SUMAtriptan (IMITREX) 50 MG tablet, Take 1 tablet by mouth Daily As Needed for Headache. May repeat in 2 hours if needed., Disp: , Rfl:     amphetamine-dextroamphetamine (ADDERALL) 10 MG tablet, Take 1 tablet by mouth Daily., Disp: 30 tablet, Rfl: 0    amphetamine-dextroamphetamine XR (Adderall XR) 30 MG 24 hr capsule, Take 1 capsule by mouth Daily, Disp: 30 capsule, Rfl: 0    midodrine (PROAMATINE) 10 MG tablet, Take 1 tablet by mouth 3 (Three) Times a Day Before Meals., Disp: 90 tablet, Rfl: 3    traZODone (DESYREL) 50 MG tablet, Take 1 tablet by mouth At Night As Needed for Sleep., Disp: 30 tablet, Rfl: 5       Objective:      Vitals:    02/22/24 0925   BP: 132/70   Pulse: 113   SpO2: 97%     Vitals and nursing note reviewed.   Constitutional:       Appearance: Normal and healthy appearance. Well-developed and not in distress.   Eyes:      Extraocular Movements: Extraocular movements intact.      Pupils: Pupils are equal, round, and reactive to light.   HENT:      Head: Normocephalic and atraumatic.    Mouth/Throat:      Pharynx: Oropharynx is clear.   Neck:      Vascular: JVD normal.      Trachea: Trachea normal.   Pulmonary:      Effort: Pulmonary effort is normal.      Breath sounds: Normal breath sounds. No wheezing. No rhonchi. No rales.   Cardiovascular:      PMI at left midclavicular line. Normal rate. Regular rhythm. Normal S1. Normal S2.       Murmurs: There is a grade 2/6 systolic murmur.      No gallop.  No click. No rub.   Pulses:     Dorsalis pedis: 2+ bilaterally.     Posterior tibial: 2+ bilaterally.  Abdominal:      General: Bowel sounds are normal.      Palpations: Abdomen is soft.      Tenderness: There is no abdominal tenderness.   Musculoskeletal: Normal range of motion.      Cervical back: Normal range of  motion and neck supple. Skin:     General: Skin is warm and dry.      Capillary Refill: Capillary refill takes less than 2 seconds.   Feet:      Right foot:      Skin integrity: Skin integrity normal.      Left foot:      Skin integrity: Skin integrity normal.   Neurological:      Mental Status: Alert and oriented to person, place and time.      Sensory: Sensation is intact.      Motor: Motor function is intact.      Coordination: Coordination is intact.   Psychiatric:         Speech: Speech normal.         Behavior: Behavior is cooperative.         Lab Review:   Carotid ultrasound bilateral performed on 02/01/2024 showed antegrade flow was demonstrated in bilateral vertebral arteries.      Tilt table test performed on 02/02/2024 was reviewed with the patient. Appropriate heart rate and blood pressure response to initial tilt phase. Initially, the patient had an appropriate heart rate and blood pressure response to sublingual nitroglycerin provocation until minute 4. At minute 4, the heart rate dropped quickly from 106 beats per minute to 49 beats per minute with no change in blood pressure followed by syncopal event. Findings are consistent with a cardioinhibitory syncopal response to sublingual nitroglycerin.    Electrocardiogram completed today in-office was normal.         Procedures  ECG 12-lead performed today in-office.       Assessment/Plan:     Problem List Items Addressed This Visit       Syncope and collapse - Primary       1. Syncope with collapse  His recent ultrasound of bilateral carotids showed antegrade flow was demonstrated in bilateral vertebral arteries. His recent tilt table test revealed appropriate heart rate and blood pressure response to initial tilt phase. Initially, the patient had an appropriate heart rate and blood pressure response to sublingual nitroglycerin provocation until minute 4. At minute 4, the heart rate dropped quickly from 106 beats per minute to 49 beats per minute with no  change in blood pressure followed by syncopal event. Findings are consistent with a cardioinhibitory syncopal response to sublingual nitroglycerin.    He was advised to increase fluid intake and to drink at least 1 Gatorade or Powerade every day. He was prescribed midodrine 3 times a day before each meal for 6 months. He was advised on an exercise to perform when he feels a syncopal episode coming on. If he has another episode of passing out, he will call and let me know.    Recommendations/plans:  The patient will follow up in 6 months.    Transcribed from ambient dictation for Orion Alva DO by Mily Yoo.  02/22/24   11:47 CST    Patient or patient representative verbalized consent to the visit recording.  I have personally performed the services described in this document as transcribed by the above individual, and it is both accurate and complete.  Orion Alva DO  3/3/2024  12:40 CST

## 2024-02-28 DIAGNOSIS — F51.01 PRIMARY INSOMNIA: ICD-10-CM

## 2024-02-28 DIAGNOSIS — F90.2 ATTENTION DEFICIT HYPERACTIVITY DISORDER (ADHD), COMBINED TYPE: ICD-10-CM

## 2024-02-28 RX ORDER — TRAZODONE HYDROCHLORIDE 50 MG/1
50 TABLET ORAL NIGHTLY PRN
Qty: 30 TABLET | Refills: 5 | Status: SHIPPED | OUTPATIENT
Start: 2024-02-28

## 2024-02-28 RX ORDER — DEXTROAMPHETAMINE SACCHARATE, AMPHETAMINE ASPARTATE, DEXTROAMPHETAMINE SULFATE AND AMPHETAMINE SULFATE 2.5; 2.5; 2.5; 2.5 MG/1; MG/1; MG/1; MG/1
10 TABLET ORAL DAILY
Qty: 30 TABLET | Refills: 0 | Status: SHIPPED | OUTPATIENT
Start: 2024-02-28

## 2024-02-28 RX ORDER — DEXTROAMPHETAMINE SACCHARATE, AMPHETAMINE ASPARTATE MONOHYDRATE, DEXTROAMPHETAMINE SULFATE AND AMPHETAMINE SULFATE 7.5; 7.5; 7.5; 7.5 MG/1; MG/1; MG/1; MG/1
30 CAPSULE, EXTENDED RELEASE ORAL DAILY
Qty: 30 CAPSULE | Refills: 0 | Status: SHIPPED | OUTPATIENT
Start: 2024-02-28

## 2024-02-28 NOTE — TELEPHONE ENCOUNTER
Caller: Rod Burnham    Relationship: Self    Best call back number:     [phone] (Home)       Requested Prescriptions:   Requested Prescriptions     Pending Prescriptions Disp Refills    amphetamine-dextroamphetamine XR (Adderall XR) 30 MG 24 hr capsule 30 capsule 0     Sig: Take 1 capsule by mouth Daily    amphetamine-dextroamphetamine (ADDERALL) 10 MG tablet 30 tablet 0     Sig: Take 1 tablet by mouth Daily.    traZODone (DESYREL) 50 MG tablet 30 tablet 5     Sig: Take 1 tablet by mouth At Night As Needed for Sleep.        Pharmacy where request should be sent: [place], [place] - [address] [phone] [facility] [phone]     Last office visit with prescribing clinician: 2/9/2024   Last telemedicine visit with prescribing clinician: Visit date not found   Next office visit with prescribing clinician: 8/13/2024         Does the patient have less than a 3 day supply:  [x] Yes  [] No    Odell Hernandez Rep   02/28/24 09:51 CST

## 2024-02-28 NOTE — TELEPHONE ENCOUNTER
[place]NL    Per protocol last refill: 01/29/2024    Rx Refill Note  Requested Prescriptions     Pending Prescriptions Disp Refills    amphetamine-dextroamphetamine XR (Adderall XR) 30 MG 24 hr capsule 30 capsule 0     Sig: Take 1 capsule by mouth Daily    amphetamine-dextroamphetamine (ADDERALL) 10 MG tablet 30 tablet 0     Sig: Take 1 tablet by mouth Daily.    traZODone (DESYREL) 50 MG tablet 30 tablet 5     Sig: Take 1 tablet by mouth At Night As Needed for Sleep.      Last office visit with prescribing clinician: 2/9/2024   Last telemedicine visit with prescribing clinician: Visit date not found   Next office visit with prescribing clinician: 8/13/2024                         Would you like a call back once the refill request has been completed: [] Yes [] No    If the office needs to give you a call back, can they leave a voicemail: [] Yes [] No    Kell Fermin MA  02/28/24, 14:01 CST

## 2024-03-03 PROBLEM — R55 SYNCOPE AND COLLAPSE: Status: ACTIVE | Noted: 2024-03-03

## 2024-03-29 DIAGNOSIS — F90.2 ATTENTION DEFICIT HYPERACTIVITY DISORDER (ADHD), COMBINED TYPE: ICD-10-CM

## 2024-03-29 DIAGNOSIS — F51.01 PRIMARY INSOMNIA: ICD-10-CM

## 2024-03-29 RX ORDER — TRAZODONE HYDROCHLORIDE 50 MG/1
50 TABLET ORAL NIGHTLY PRN
Qty: 30 TABLET | Refills: 5 | Status: SHIPPED | OUTPATIENT
Start: 2024-03-29

## 2024-03-29 RX ORDER — DEXTROAMPHETAMINE SACCHARATE, AMPHETAMINE ASPARTATE, DEXTROAMPHETAMINE SULFATE AND AMPHETAMINE SULFATE 2.5; 2.5; 2.5; 2.5 MG/1; MG/1; MG/1; MG/1
10 TABLET ORAL DAILY
Qty: 30 TABLET | Refills: 0 | Status: SHIPPED | OUTPATIENT
Start: 2024-03-29

## 2024-03-29 RX ORDER — DEXTROAMPHETAMINE SACCHARATE, AMPHETAMINE ASPARTATE MONOHYDRATE, DEXTROAMPHETAMINE SULFATE AND AMPHETAMINE SULFATE 7.5; 7.5; 7.5; 7.5 MG/1; MG/1; MG/1; MG/1
30 CAPSULE, EXTENDED RELEASE ORAL DAILY
Qty: 30 CAPSULE | Refills: 0 | Status: SHIPPED | OUTPATIENT
Start: 2024-03-29

## 2024-03-29 NOTE — TELEPHONE ENCOUNTER
Caller: Tej Rod ALDO    Relationship: Self    Best call back number: [phone]     Requested Prescriptions:   Requested Prescriptions     Pending Prescriptions Disp Refills    amphetamine-dextroamphetamine XR (Adderall XR) 30 MG 24 hr capsule 30 capsule 0     Sig: Take 1 capsule by mouth Daily    amphetamine-dextroamphetamine (ADDERALL) 10 MG tablet 30 tablet 0     Sig: Take 1 tablet by mouth Daily.    traZODone (DESYREL) 50 MG tablet 30 tablet 5     Sig: Take 1 tablet by mouth At Night As Needed for Sleep.        Pharmacy where request should be sent: [place], [place] - [address] [facility] [phone] [facility] [phone]      Last office visit with prescribing clinician: 2/9/2024   Last telemedicine visit with prescribing clinician: Visit date not found   Next office visit with prescribing clinician: 8/13/2024     Additional details provided by patient:     Does the patient have less than a 3 day supply:  [x] Yes  [] No    Would you like a call back once the refill request has been completed: [] Yes [x] No    If the office needs to give you a call back, can they leave a voicemail: [] Yes [x] No    Odell Reid   03/29/24 12:47 CDT

## 2024-03-29 NOTE — TELEPHONE ENCOUNTER
Rx Refill Note  Requested Prescriptions     Pending Prescriptions Disp Refills    amphetamine-dextroamphetamine XR (Adderall XR) 30 MG 24 hr capsule 30 capsule 0     Sig: Take 1 capsule by mouth Daily    amphetamine-dextroamphetamine (ADDERALL) 10 MG tablet 30 tablet 0     Sig: Take 1 tablet by mouth Daily.    traZODone (DESYREL) 50 MG tablet 30 tablet 5     Sig: Take 1 tablet by mouth At Night As Needed for Sleep.      Last office visit with office: 02/09/2024  Next office visit with office: 08/13/2024    UDS: N/A    DATE OF LAST REFILL: 02/28/2024    Controlled Substance Agreement:  NOT UP TO DATE - 01/21/2022    JOSÉ OR REUBEN: WNL         {TIP  Is Refill Pharmacy correct?:  Kell Fermin MA  03/29/24, 16:21 CDT

## 2024-04-10 ENCOUNTER — TELEPHONE (OUTPATIENT)
Dept: FAMILY MEDICINE CLINIC | Facility: CLINIC | Age: 39
End: 2024-04-10
Payer: COMMERCIAL

## 2024-04-10 DIAGNOSIS — F90.2 ATTENTION DEFICIT HYPERACTIVITY DISORDER (ADHD), COMBINED TYPE: ICD-10-CM

## 2024-04-10 RX ORDER — DEXTROAMPHETAMINE SACCHARATE, AMPHETAMINE ASPARTATE MONOHYDRATE, DEXTROAMPHETAMINE SULFATE AND AMPHETAMINE SULFATE 7.5; 7.5; 7.5; 7.5 MG/1; MG/1; MG/1; MG/1
30 CAPSULE, EXTENDED RELEASE ORAL DAILY
Qty: 30 CAPSULE | Refills: 0 | Status: SHIPPED | OUTPATIENT
Start: 2024-04-10

## 2024-04-10 NOTE — TELEPHONE ENCOUNTER
Caller: Rod Burnham    Relationship: Self    Best call back number: [phone]     Requested Prescriptions:   Requested Prescriptions     Pending Prescriptions Disp Refills    amphetamine-dextroamphetamine XR (Adderall XR) 30 MG 24 hr capsule 30 capsule 0     Sig: Take 1 capsule by mouth Daily        Pharmacy where request should be sent: [id number] - [place] - [address] [facility] [phone] [facility] [phone]      Last office visit with prescribing clinician: 2/9/2024   Last telemedicine visit with prescribing clinician: Visit date not found   Next office visit with prescribing clinician: 8/13/2024     Additional details provided by patient: JOHANNA FAN, TYSON HWANG DID NOT HAVE IN STOCK     Does the patient have less than a 3 day supply:  [x] Yes  [] No    Would you like a call back once the refill request has been completed: [x] Yes [] No    If the office needs to give you a call back, can they leave a voicemail: [x] Yes [] No    Odell Atwood Rep   04/10/24 12:38 CDT

## 2024-04-10 NOTE — TELEPHONE ENCOUNTER
REUBEN ran and showing he picked up a 30 day supply from [facility] on 04/05/24

## 2024-05-10 DIAGNOSIS — F51.01 PRIMARY INSOMNIA: ICD-10-CM

## 2024-05-10 DIAGNOSIS — Z02.83 ENCOUNTER FOR DRUG SCREENING: Primary | ICD-10-CM

## 2024-05-10 DIAGNOSIS — F90.2 ATTENTION DEFICIT HYPERACTIVITY DISORDER (ADHD), COMBINED TYPE: ICD-10-CM

## 2024-05-10 RX ORDER — TRAZODONE HYDROCHLORIDE 50 MG/1
50 TABLET ORAL NIGHTLY PRN
Qty: 30 TABLET | Refills: 5 | Status: SHIPPED | OUTPATIENT
Start: 2024-05-10

## 2024-05-10 RX ORDER — DEXTROAMPHETAMINE SACCHARATE, AMPHETAMINE ASPARTATE MONOHYDRATE, DEXTROAMPHETAMINE SULFATE AND AMPHETAMINE SULFATE 7.5; 7.5; 7.5; 7.5 MG/1; MG/1; MG/1; MG/1
30 CAPSULE, EXTENDED RELEASE ORAL DAILY
Qty: 30 CAPSULE | Refills: 0 | Status: SHIPPED | OUTPATIENT
Start: 2024-05-10

## 2024-05-10 RX ORDER — DEXTROAMPHETAMINE SACCHARATE, AMPHETAMINE ASPARTATE, DEXTROAMPHETAMINE SULFATE AND AMPHETAMINE SULFATE 2.5; 2.5; 2.5; 2.5 MG/1; MG/1; MG/1; MG/1
10 TABLET ORAL DAILY
Qty: 30 TABLET | Refills: 0 | Status: SHIPPED | OUTPATIENT
Start: 2024-05-10

## 2024-05-13 LAB
AMPHETAMINES UR QL SCN: POSITIVE NG/ML
BARBITURATES UR QL SCN: NEGATIVE NG/ML
BENZODIAZ UR QL SCN: NEGATIVE NG/ML
BZE UR QL SCN: NEGATIVE NG/ML
CANNABINOIDS UR QL SCN: POSITIVE NG/ML
CREAT UR-MCNC: 139.3 MG/DL (ref 20–300)
LABORATORY COMMENT REPORT: ABNORMAL
METHADONE UR QL SCN: NEGATIVE NG/ML
OPIATES UR QL SCN: NEGATIVE NG/ML
OXYCODONE+OXYMORPHONE UR QL SCN: NEGATIVE NG/ML
PCP UR QL: NEGATIVE NG/ML
PH UR: 6.2 [PH] (ref 4.5–8.9)
PROPOXYPH UR QL SCN: NEGATIVE NG/ML

## 2024-06-10 DIAGNOSIS — F90.2 ATTENTION DEFICIT HYPERACTIVITY DISORDER (ADHD), COMBINED TYPE: ICD-10-CM

## 2024-06-10 NOTE — TELEPHONE ENCOUNTER
Caller: Tej Rod ALDO    Relationship: Self    Best call back number: [phone]    Requested Prescriptions:   Requested Prescriptions     Pending Prescriptions Disp Refills    amphetamine-dextroamphetamine XR (Adderall XR) 30 MG 24 hr capsule 30 capsule 0     Sig: Take 1 capsule by mouth Daily    amphetamine-dextroamphetamine (ADDERALL) 10 MG tablet 30 tablet 0     Sig: Take 1 tablet by mouth Daily.        Pharmacy where request should be sent: [id number] - [place], [place] - [address] [place] [address] [phone] [facility] [phone]      Last office visit with prescribing clinician: 2/9/2024   Last telemedicine visit with prescribing clinician: Visit date not found   Next office visit with prescribing clinician: 8/13/2024     Does the patient have less than a 3 day supply:  [x] Yes  [] No    Would you like a call back once the refill request has been completed: [] Yes [x] No    If the office needs to give you a call back, can they leave a voicemail: [] Yes [x] No    Odell Montejo   06/10/24 16:40 CDT

## 2024-06-11 RX ORDER — DEXTROAMPHETAMINE SACCHARATE, AMPHETAMINE ASPARTATE MONOHYDRATE, DEXTROAMPHETAMINE SULFATE AND AMPHETAMINE SULFATE 7.5; 7.5; 7.5; 7.5 MG/1; MG/1; MG/1; MG/1
30 CAPSULE, EXTENDED RELEASE ORAL DAILY
Qty: 30 CAPSULE | Refills: 0 | Status: SHIPPED | OUTPATIENT
Start: 2024-06-11

## 2024-06-11 RX ORDER — DEXTROAMPHETAMINE SACCHARATE, AMPHETAMINE ASPARTATE, DEXTROAMPHETAMINE SULFATE AND AMPHETAMINE SULFATE 2.5; 2.5; 2.5; 2.5 MG/1; MG/1; MG/1; MG/1
10 TABLET ORAL DAILY
Qty: 30 TABLET | Refills: 0 | Status: SHIPPED | OUTPATIENT
Start: 2024-06-11

## 2024-06-11 NOTE — TELEPHONE ENCOUNTER
Rx Refill Note  Requested Prescriptions     Pending Prescriptions Disp Refills    amphetamine-dextroamphetamine XR (Adderall XR) 30 MG 24 hr capsule 30 capsule 0     Sig: Take 1 capsule by mouth Daily    amphetamine-dextroamphetamine (ADDERALL) 10 MG tablet 30 tablet 0     Sig: Take 1 tablet by mouth Daily.      Last office visit with office: 02/09/2024  Next office visit with office: 08/13/2024    UDS: 05/10/2024    DATE OF LAST REFILL: 05/10/2024    Controlled Substance Agreement: up to date    JOSÉ OR REUBEN: cody         {TIP  Is Refill Pharmacy correct?:  Kell Fermin MA  06/11/24, 07:57 CDT

## 2024-07-10 DIAGNOSIS — F90.2 ATTENTION DEFICIT HYPERACTIVITY DISORDER (ADHD), COMBINED TYPE: ICD-10-CM

## 2024-07-10 NOTE — TELEPHONE ENCOUNTER
Caller: Tej Rod ALDO    Relationship: Self    Best call back number: [phone]  Requested Prescriptions:   Requested Prescriptions     Pending Prescriptions Disp Refills    amphetamine-dextroamphetamine XR (Adderall XR) 30 MG 24 hr capsule 30 capsule 0     Sig: Take 1 capsule by mouth Daily    amphetamine-dextroamphetamine (ADDERALL) 10 MG tablet 30 tablet 0     Sig: Take 1 tablet by mouth Daily.        Pharmacy where request should be sent: [facility]/PHARMACY #[id number] - [place], [place] - [address] [place] [address] [phone] [facility] [phone]      Last office visit with prescribing clinician: 2/9/2024   Last telemedicine visit with prescribing clinician: Visit date not found   Next office visit with prescribing clinician: 8/13/2024     Does the patient have less than a 3 day supply:  [x] Yes  [] No    Would you like a call back once the refill request has been completed: [] Yes [x] No    If the office needs to give you a call back, can they leave a voicemail: [] Yes [x] No    Odell Montejo Rep   07/10/24 09:34 CDT

## 2024-07-11 RX ORDER — DEXTROAMPHETAMINE SACCHARATE, AMPHETAMINE ASPARTATE MONOHYDRATE, DEXTROAMPHETAMINE SULFATE AND AMPHETAMINE SULFATE 7.5; 7.5; 7.5; 7.5 MG/1; MG/1; MG/1; MG/1
30 CAPSULE, EXTENDED RELEASE ORAL DAILY
Qty: 30 CAPSULE | Refills: 0 | Status: SHIPPED | OUTPATIENT
Start: 2024-07-11

## 2024-07-11 RX ORDER — DEXTROAMPHETAMINE SACCHARATE, AMPHETAMINE ASPARTATE, DEXTROAMPHETAMINE SULFATE AND AMPHETAMINE SULFATE 2.5; 2.5; 2.5; 2.5 MG/1; MG/1; MG/1; MG/1
10 TABLET ORAL DAILY
Qty: 30 TABLET | Refills: 0 | Status: SHIPPED | OUTPATIENT
Start: 2024-07-11

## 2024-07-11 NOTE — TELEPHONE ENCOUNTER
Rx Refill Note  Requested Prescriptions     Pending Prescriptions Disp Refills    amphetamine-dextroamphetamine XR (Adderall XR) 30 MG 24 hr capsule 30 capsule 0     Sig: Take 1 capsule by mouth Daily    amphetamine-dextroamphetamine (ADDERALL) 10 MG tablet 30 tablet 0     Sig: Take 1 tablet by mouth Daily.      Last office visit with office: 02/09/2024  Next office visit with office: 08/13/2024    UDS: 05/10/2024    DATE OF LAST REFILL: 06/11/2024    Controlled Substance Agreement: up to date - 05/10/2024    JOSÉ OR REUBEN: wnl         {TIP  Is Refill Pharmacy correct?:  Kell Fermin MA  07/11/24, 15:25 CDT

## 2024-08-12 DIAGNOSIS — F90.2 ATTENTION DEFICIT HYPERACTIVITY DISORDER (ADHD), COMBINED TYPE: ICD-10-CM

## 2024-08-12 RX ORDER — DEXTROAMPHETAMINE SACCHARATE, AMPHETAMINE ASPARTATE, DEXTROAMPHETAMINE SULFATE AND AMPHETAMINE SULFATE 2.5; 2.5; 2.5; 2.5 MG/1; MG/1; MG/1; MG/1
10 TABLET ORAL DAILY
Qty: 30 TABLET | Refills: 0 | Status: CANCELLED | OUTPATIENT
Start: 2024-08-12

## 2024-08-12 RX ORDER — DEXTROAMPHETAMINE SACCHARATE, AMPHETAMINE ASPARTATE MONOHYDRATE, DEXTROAMPHETAMINE SULFATE AND AMPHETAMINE SULFATE 7.5; 7.5; 7.5; 7.5 MG/1; MG/1; MG/1; MG/1
30 CAPSULE, EXTENDED RELEASE ORAL DAILY
Qty: 30 CAPSULE | Refills: 0 | Status: CANCELLED | OUTPATIENT
Start: 2024-08-12

## 2024-08-12 NOTE — TELEPHONE ENCOUNTER
Caller: Rod Burnham ALDO    Relationship: Self    Best call back number: [phone]     Requested Prescriptions:   Requested Prescriptions     Pending Prescriptions Disp Refills    amphetamine-dextroamphetamine XR (Adderall XR) 30 MG 24 hr capsule 30 capsule 0     Sig: Take 1 capsule by mouth Daily    amphetamine-dextroamphetamine (ADDERALL) 10 MG tablet 30 tablet 0     Sig: Take 1 tablet by mouth Daily.        Pharmacy where request should be sent: [address] [phone] [facility] [phone]      Last office visit with prescribing clinician: 2/9/2024   Last telemedicine visit with prescribing clinician: Visit date not found   Next office visit with prescribing clinician: 8/13/2024     Additional details provided by patient: LESS THEN 3 DAYS LEFT    Does the patient have less than a 3 day supply:  [x] Yes  [] No    Would you like a call back once the refill request has been completed: [] Yes [x] No    If the office needs to give you a call back, can they leave a voicemail: [] Yes [x] No    Odell Barber Rep   08/12/24 15:59 CDT

## 2024-08-13 ENCOUNTER — OFFICE VISIT (OUTPATIENT)
Dept: FAMILY MEDICINE CLINIC | Facility: CLINIC | Age: 39
End: 2024-08-13
Payer: COMMERCIAL

## 2024-08-13 VITALS
BODY MASS INDEX: 24.34 KG/M2 | WEIGHT: 170 LBS | HEIGHT: 70 IN | SYSTOLIC BLOOD PRESSURE: 110 MMHG | TEMPERATURE: 97.5 F | HEART RATE: 98 BPM | OXYGEN SATURATION: 100 % | RESPIRATION RATE: 18 BRPM | DIASTOLIC BLOOD PRESSURE: 70 MMHG

## 2024-08-13 DIAGNOSIS — F90.2 ATTENTION DEFICIT HYPERACTIVITY DISORDER (ADHD), COMBINED TYPE: Chronic | ICD-10-CM

## 2024-08-13 DIAGNOSIS — F51.01 PRIMARY INSOMNIA: Chronic | ICD-10-CM

## 2024-08-13 DIAGNOSIS — Z00.00 WELLNESS EXAMINATION: Primary | ICD-10-CM

## 2024-08-13 LAB
ALBUMIN SERPL-MCNC: 4.6 G/DL (ref 3.5–5.2)
ALBUMIN/GLOB SERPL: 2.1 G/DL
ALP SERPL-CCNC: 61 U/L (ref 39–117)
ALT SERPL-CCNC: 45 U/L (ref 1–41)
AST SERPL-CCNC: 34 U/L (ref 1–40)
BASOPHILS # BLD AUTO: 0.05 10*3/MM3 (ref 0–0.2)
BASOPHILS NFR BLD AUTO: 0.7 % (ref 0–1.5)
BILIRUB SERPL-MCNC: 0.3 MG/DL (ref 0–1.2)
BUN SERPL-MCNC: 23 MG/DL (ref 6–20)
BUN/CREAT SERPL: 24 (ref 7–25)
CALCIUM SERPL-MCNC: 9.5 MG/DL (ref 8.6–10.5)
CHLORIDE SERPL-SCNC: 101 MMOL/L (ref 98–107)
CHOLEST SERPL-MCNC: 133 MG/DL (ref 0–200)
CO2 SERPL-SCNC: 29.4 MMOL/L (ref 22–29)
CREAT SERPL-MCNC: 0.96 MG/DL (ref 0.76–1.27)
EGFRCR SERPLBLD CKD-EPI 2021: 103.1 ML/MIN/1.73
EOSINOPHIL # BLD AUTO: 0.17 10*3/MM3 (ref 0–0.4)
EOSINOPHIL NFR BLD AUTO: 2.4 % (ref 0.3–6.2)
ERYTHROCYTE [DISTWIDTH] IN BLOOD BY AUTOMATED COUNT: 12.6 % (ref 12.3–15.4)
GLOBULIN SER CALC-MCNC: 2.2 GM/DL
GLUCOSE SERPL-MCNC: 101 MG/DL (ref 65–99)
HCT VFR BLD AUTO: 46.3 % (ref 37.5–51)
HDLC SERPL-MCNC: 56 MG/DL (ref 40–60)
HGB BLD-MCNC: 15.5 G/DL (ref 13–17.7)
IMM GRANULOCYTES # BLD AUTO: 0.02 10*3/MM3 (ref 0–0.05)
IMM GRANULOCYTES NFR BLD AUTO: 0.3 % (ref 0–0.5)
LDLC SERPL CALC-MCNC: 65 MG/DL (ref 0–100)
LYMPHOCYTES # BLD AUTO: 2.17 10*3/MM3 (ref 0.7–3.1)
LYMPHOCYTES NFR BLD AUTO: 31.1 % (ref 19.6–45.3)
MCH RBC QN AUTO: 31.4 PG (ref 26.6–33)
MCHC RBC AUTO-ENTMCNC: 33.5 G/DL (ref 31.5–35.7)
MCV RBC AUTO: 93.7 FL (ref 79–97)
MONOCYTES # BLD AUTO: 0.72 10*3/MM3 (ref 0.1–0.9)
MONOCYTES NFR BLD AUTO: 10.3 % (ref 5–12)
NEUTROPHILS # BLD AUTO: 3.85 10*3/MM3 (ref 1.7–7)
NEUTROPHILS NFR BLD AUTO: 55.2 % (ref 42.7–76)
NRBC BLD AUTO-RTO: 0 /100 WBC (ref 0–0.2)
PLATELET # BLD AUTO: 262 10*3/MM3 (ref 140–450)
POTASSIUM SERPL-SCNC: 4.4 MMOL/L (ref 3.5–5.2)
PROT SERPL-MCNC: 6.8 G/DL (ref 6–8.5)
RBC # BLD AUTO: 4.94 10*6/MM3 (ref 4.14–5.8)
SODIUM SERPL-SCNC: 137 MMOL/L (ref 136–145)
TRIGL SERPL-MCNC: 58 MG/DL (ref 0–150)
TSH SERPL DL<=0.005 MIU/L-ACNC: 1.36 UIU/ML (ref 0.27–4.2)
VLDLC SERPL CALC-MCNC: 12 MG/DL (ref 5–40)
WBC # BLD AUTO: 6.98 10*3/MM3 (ref 3.4–10.8)

## 2024-08-13 PROCEDURE — 99214 OFFICE O/P EST MOD 30 MIN: CPT | Performed by: NURSE PRACTITIONER

## 2024-08-13 RX ORDER — DEXTROAMPHETAMINE SACCHARATE, AMPHETAMINE ASPARTATE, DEXTROAMPHETAMINE SULFATE AND AMPHETAMINE SULFATE 2.5; 2.5; 2.5; 2.5 MG/1; MG/1; MG/1; MG/1
10 TABLET ORAL DAILY
Qty: 30 TABLET | Refills: 0 | Status: SHIPPED | OUTPATIENT
Start: 2024-08-13

## 2024-08-13 RX ORDER — DEXTROAMPHETAMINE SACCHARATE, AMPHETAMINE ASPARTATE MONOHYDRATE, DEXTROAMPHETAMINE SULFATE AND AMPHETAMINE SULFATE 7.5; 7.5; 7.5; 7.5 MG/1; MG/1; MG/1; MG/1
30 CAPSULE, EXTENDED RELEASE ORAL DAILY
Qty: 30 CAPSULE | Refills: 0 | Status: SHIPPED | OUTPATIENT
Start: 2024-08-13

## 2024-08-13 NOTE — PROGRESS NOTES
"Subjective   Chief Complaint:  Evaluation of ADHD and insomnia    History of Present Illness  This is a 39 y.o. male presenting today for his regular medication checkup.  Continues Adderall 30 mg XR in the morning with a 10 mg booster dose-reports doing well with ADHD.  Also takes trazodone as needed for sleep.  Of note, he was on midodrine back in February but has not had to take any.  He describes what sounds like a vagal episode in a anthony chair.  He reports no recurrence since.  He saw cardiology for this.    Past Medical, Surgical, Social, and Family History:  No Known Allergies   Past Medical History:   Diagnosis Date    ADHD (attention deficit hyperactivity disorder)       Past Surgical History:   Procedure Laterality Date    SHOULDER SURGERY Right       Social History     Socioeconomic History    Marital status:    Tobacco Use    Smoking status: Never    Smokeless tobacco: Never   Vaping Use    Vaping status: Never Used   Substance and Sexual Activity    Alcohol use: Never    Drug use: Not Currently     Types: Marijuana    Sexual activity: Defer      Family History   Problem Relation Age of Onset    Breast cancer Mother 53    Melanoma Father     Breast cancer Maternal Grandmother        Objective   Vital Signs  /70 (BP Location: Left arm, Patient Position: Sitting, Cuff Size: Large Adult)   Pulse 98   Temp 97.5 °F (36.4 °C) (Infrared)   Resp 18   Ht 177.8 cm (70\")   Wt 77.1 kg (170 lb)   SpO2 100%   BMI 24.39 kg/m²    Physical Exam  Constitutional:       General: He is not in acute distress.  Neck:      Vascular: No carotid bruit.   Cardiovascular:      Rate and Rhythm: Normal rate and regular rhythm.      Pulses: Normal pulses.           Dorsalis pedis pulses are 2+ on the right side and 2+ on the left side.        Posterior tibial pulses are 2+ on the right side and 2+ on the left side.      Heart sounds: No murmur heard.     No friction rub. No gallop.   Pulmonary:      Effort: " Pulmonary effort is normal. No respiratory distress.      Breath sounds: Normal breath sounds. No wheezing or rhonchi.   Musculoskeletal:      Right lower leg: No edema.      Left lower leg: No edema.   Neurological:      Mental Status: He is alert.   Psychiatric:         Mood and Affect: Mood normal.         Behavior: Behavior normal.         Thought Content: Thought content normal.       Assessment & Plan   Diagnoses and all orders for this visit:    1. Wellness examination (Primary)  -     CBC & Differential  -     Comprehensive Metabolic Panel  -     TSH  -     Lipid Panel    2. Attention deficit hyperactivity disorder (ADHD), combined type  Comments:  Chronic/stable-continue Adderall  Orders:  -     amphetamine-dextroamphetamine (ADDERALL) 10 MG tablet; Take 1 tablet by mouth Daily.  Dispense: 30 tablet; Refill: 0  -     amphetamine-dextroamphetamine XR (Adderall XR) 30 MG 24 hr capsule; Take 1 capsule by mouth Daily  Dispense: 30 capsule; Refill: 0    3. Primary insomnia  Comments:  Chronic/stable-continue trazodone    Plan:  Advised and educated plan of care.      Follow-up:  The patient will Return in about 6 months (around 2/13/2025) for follow-Up.    Records and Results Reviewed:  I reviewed current medications as given by patient and allergy list    BMI is within normal parameters. No other follow-up for BMI required.    Electronically signed by PUMA Pantoja, 08/13/24, 9:12 AM CDT.

## 2024-08-14 NOTE — PROGRESS NOTES
Reviewed results - Quaerot message sent.  If not seen in 3 days (3 day alert set), will send to pool to call the message.      Electronically signed by PUMA Pantoja, 08/14/24, 7:47 AM CDT.

## 2024-08-28 DIAGNOSIS — F90.2 ATTENTION DEFICIT HYPERACTIVITY DISORDER (ADHD), COMBINED TYPE: Chronic | ICD-10-CM

## 2024-08-28 RX ORDER — DEXTROAMPHETAMINE SACCHARATE, AMPHETAMINE ASPARTATE MONOHYDRATE, DEXTROAMPHETAMINE SULFATE AND AMPHETAMINE SULFATE 7.5; 7.5; 7.5; 7.5 MG/1; MG/1; MG/1; MG/1
30 CAPSULE, EXTENDED RELEASE ORAL DAILY
Qty: 30 CAPSULE | Refills: 0 | Status: SHIPPED | OUTPATIENT
Start: 2024-08-28

## 2024-08-28 NOTE — TELEPHONE ENCOUNTER
Caller: Rod Burnham    Relationship: Self    Best call back number: [phone]     What is the best time to reach you: AS SOON AS POSSIBLE    Who are you requesting to speak with (clinical staff, provider,  specific staff member): CLINICAL    What was the call regarding: amphetamine-dextroamphetamine XR (Adderall XR) 30 MG 24 hr capsule PLEASE SEND OVER TO    [place], [place] - [address] [facility] [phone] [facility] [phone] FX     Is it okay if the provider responds through MyChart: YES

## 2024-10-01 DIAGNOSIS — F90.2 ATTENTION DEFICIT HYPERACTIVITY DISORDER (ADHD), COMBINED TYPE: Chronic | ICD-10-CM

## 2024-10-01 RX ORDER — DEXTROAMPHETAMINE SACCHARATE, AMPHETAMINE ASPARTATE MONOHYDRATE, DEXTROAMPHETAMINE SULFATE AND AMPHETAMINE SULFATE 7.5; 7.5; 7.5; 7.5 MG/1; MG/1; MG/1; MG/1
30 CAPSULE, EXTENDED RELEASE ORAL DAILY
Qty: 30 CAPSULE | Refills: 0 | Status: SHIPPED | OUTPATIENT
Start: 2024-10-01

## 2024-10-01 RX ORDER — DEXTROAMPHETAMINE SACCHARATE, AMPHETAMINE ASPARTATE, DEXTROAMPHETAMINE SULFATE AND AMPHETAMINE SULFATE 2.5; 2.5; 2.5; 2.5 MG/1; MG/1; MG/1; MG/1
10 TABLET ORAL DAILY
Qty: 30 TABLET | Refills: 0 | Status: SHIPPED | OUTPATIENT
Start: 2024-10-01

## 2024-10-01 NOTE — TELEPHONE ENCOUNTER
Caller: Tej Rod ALDO    Relationship: Self    Best call back number: [phone]     Requested Prescriptions:   Requested Prescriptions     Pending Prescriptions Disp Refills    amphetamine-dextroamphetamine XR (Adderall XR) 30 MG 24 hr capsule 30 capsule 0     Sig: Take 1 capsule by mouth Daily    amphetamine-dextroamphetamine (ADDERALL) 10 MG tablet 30 tablet 0     Sig: Take 1 tablet by mouth Daily.        Pharmacy where request should be sent: [address] [phone] [facility] [phone] FX     Last office visit with prescribing clinician: 8/13/2024   Last telemedicine visit with prescribing clinician: Visit date not found   Next office visit with prescribing clinician: 2/7/2025         Does the patient have less than a 3 day supply:  [x] Yes  [] No    Would you like a call back once the refill request has been completed: [] Yes [x] No    If the office needs to give you a call back, can they leave a voicemail: [] Yes [x] No    Odell Munoz Rep   10/01/24 11:10 CDT

## 2024-10-01 NOTE — TELEPHONE ENCOUNTER
Rx Refill Note  Requested Prescriptions     Pending Prescriptions Disp Refills    amphetamine-dextroamphetamine XR (Adderall XR) 30 MG 24 hr capsule 30 capsule 0     Sig: Take 1 capsule by mouth Daily    amphetamine-dextroamphetamine (ADDERALL) 10 MG tablet 30 tablet 0     Sig: Take 1 tablet by mouth Daily.      Last office visit with office: 08/13/24  Next office visit with office: 02/07/25    UDS: 05/10/24    DATE OF LAST REFILL: 08/13/24 08/28/24    Controlled Substance Agreement: up to date    JOSÉ OR ANTONIOP: 10/01/24         {TIP  Is Refill Pharmacy correct?:  Kierra Rashid MA  10/01/24, 11:50 CDT

## 2024-11-04 DIAGNOSIS — F51.01 PRIMARY INSOMNIA: ICD-10-CM

## 2024-11-04 DIAGNOSIS — F90.2 ATTENTION DEFICIT HYPERACTIVITY DISORDER (ADHD), COMBINED TYPE: Chronic | ICD-10-CM

## 2024-11-04 RX ORDER — TRAZODONE HYDROCHLORIDE 50 MG/1
50 TABLET, FILM COATED ORAL NIGHTLY PRN
Qty: 30 TABLET | Refills: 5 | Status: SHIPPED | OUTPATIENT
Start: 2024-11-04

## 2024-11-04 RX ORDER — DEXTROAMPHETAMINE SACCHARATE, AMPHETAMINE ASPARTATE, DEXTROAMPHETAMINE SULFATE AND AMPHETAMINE SULFATE 2.5; 2.5; 2.5; 2.5 MG/1; MG/1; MG/1; MG/1
10 TABLET ORAL DAILY
Qty: 30 TABLET | Refills: 0 | Status: SHIPPED | OUTPATIENT
Start: 2024-11-04

## 2024-11-04 RX ORDER — DEXTROAMPHETAMINE SACCHARATE, AMPHETAMINE ASPARTATE MONOHYDRATE, DEXTROAMPHETAMINE SULFATE AND AMPHETAMINE SULFATE 7.5; 7.5; 7.5; 7.5 MG/1; MG/1; MG/1; MG/1
30 CAPSULE, EXTENDED RELEASE ORAL DAILY
Qty: 30 CAPSULE | Refills: 0 | Status: SHIPPED | OUTPATIENT
Start: 2024-11-04

## 2024-11-04 NOTE — TELEPHONE ENCOUNTER
Rx Refill Note  Requested Prescriptions     Pending Prescriptions Disp Refills    amphetamine-dextroamphetamine (ADDERALL) 10 MG tablet 30 tablet 0     Sig: Take 1 tablet by mouth Daily.    amphetamine-dextroamphetamine XR (Adderall XR) 30 MG 24 hr capsule 30 capsule 0     Sig: Take 1 capsule by mouth Daily    traZODone (DESYREL) 50 MG tablet 30 tablet 5     Sig: Take 1 tablet by mouth At Night As Needed for Sleep.      Last office visit with office: 0813/24  Next office visit with office: 02/07/25    UDS: 05/10/24    DATE OF LAST REFILL: 10/01/24    Controlled Substance Agreement: up to date    [place]          {TIP  Is Refill Pharmacy correct?:  Caterina Nunn MA  11/04/24, 15:41 CST

## 2024-11-04 NOTE — TELEPHONE ENCOUNTER
Caller: Rod Burnham    Relationship: Self    Best call back number: [phone]     Requested Prescriptions:   Requested Prescriptions     Pending Prescriptions Disp Refills    amphetamine-dextroamphetamine (ADDERALL) 10 MG tablet 30 tablet 0     Sig: Take 1 tablet by mouth Daily.    amphetamine-dextroamphetamine XR (Adderall XR) 30 MG 24 hr capsule 30 capsule 0     Sig: Take 1 capsule by mouth Daily    traZODone (DESYREL) 50 MG tablet 30 tablet 5     Sig: Take 1 tablet by mouth At Night As Needed for Sleep.        Pharmacy where request should be sent: [facility] [address] [facility] [phone] [facility] [phone]      Last office visit with prescribing clinician: 8/13/2024   Last telemedicine visit with prescribing clinician: Visit date not found   Next office visit with prescribing clinician: 2/7/2025     Additional details provided by patient:     Does the patient have less than a 3 day supply:  [x] Yes  [] No    Would you like a call back once the refill request has been completed: [x] Yes [] No      Odell Lee Rep   11/04/24 15:28 CST

## 2024-11-30 NOTE — TELEPHONE ENCOUNTER
Caller: Rod Burnham    Relationship: Self    Best call back number: [phone]    Medication needed:   Requested Prescriptions     Pending Prescriptions Disp Refills   • Adderall XR 30 MG 24 hr capsule 30 capsule 0     Sig: Take 1 capsule by mouth Daily   • amphetamine-dextroamphetamine (ADDERALL) 10 MG tablet        Sig: Take 1 tablet by mouth Daily.       When do you need the refill by: MONDAY        Does the patient have less than a 3 day supply:  [] Yes  [x] No    What is the patient's preferred pharmacy: [facility] [address] [place] [address] [phone] [facility] [phone]               Goal Outcome Evaluation:    Pt called out feeling rectal pressure around 0930. SVE by JENNIFER Lamas 10/100/0.  of viable Male with JENNIFER Lamas CNM in attendance. NICU present. Infant with spontaneous cry to mothers abdomen, dried and stimulated. Assessed by NICU team. Difficult removal of placenta, see providers note. Manual extraction done in room by Dr Galindo. Patient consented for curettage in OR.  See Dr Espinoza OR note. No laceration, no repairs done. Pt currently recovering in PACU from procedure.

## 2024-12-11 DIAGNOSIS — F90.2 ATTENTION DEFICIT HYPERACTIVITY DISORDER (ADHD), COMBINED TYPE: Chronic | ICD-10-CM

## 2024-12-11 RX ORDER — DEXTROAMPHETAMINE SACCHARATE, AMPHETAMINE ASPARTATE, DEXTROAMPHETAMINE SULFATE AND AMPHETAMINE SULFATE 2.5; 2.5; 2.5; 2.5 MG/1; MG/1; MG/1; MG/1
10 TABLET ORAL DAILY
Qty: 30 TABLET | Refills: 0 | Status: SHIPPED | OUTPATIENT
Start: 2024-12-11

## 2024-12-11 RX ORDER — DEXTROAMPHETAMINE SACCHARATE, AMPHETAMINE ASPARTATE MONOHYDRATE, DEXTROAMPHETAMINE SULFATE AND AMPHETAMINE SULFATE 7.5; 7.5; 7.5; 7.5 MG/1; MG/1; MG/1; MG/1
30 CAPSULE, EXTENDED RELEASE ORAL DAILY
Qty: 30 CAPSULE | Refills: 0 | Status: SHIPPED | OUTPATIENT
Start: 2024-12-11

## 2024-12-11 NOTE — TELEPHONE ENCOUNTER
Rx Refill Note  Requested Prescriptions     Pending Prescriptions Disp Refills    amphetamine-dextroamphetamine XR (Adderall XR) 30 MG 24 hr capsule 30 capsule 0     Sig: Take 1 capsule by mouth Daily    amphetamine-dextroamphetamine (ADDERALL) 10 MG tablet 30 tablet 0     Sig: Take 1 tablet by mouth Daily.      Last office visit with office: 08-13-24  Next office visit with office: 02/0725    UDS: 05/10/24    DATE OF LAST REFILL: 11/04/24    Controlled Substance Agreement: up to date    JOSÉ OR ANTONIOP: 12/11/24         {TIP  Is Refill Pharmacy correct?:  Kierra Rashid MA  12/11/24, 13:21 CST

## 2024-12-11 NOTE — TELEPHONE ENCOUNTER
Caller: Rod Burnham    Relationship: Self    Best call back number [phone]       Requested Prescriptions:   Requested Prescriptions     Pending Prescriptions Disp Refills    amphetamine-dextroamphetamine XR (Adderall XR) 30 MG 24 hr capsule 30 capsule 0     Sig: Take 1 capsule by mouth Daily    amphetamine-dextroamphetamine (ADDERALL) 10 MG tablet 30 tablet 0     Sig: Take 1 tablet by mouth Daily.        Pharmacy where request should be sent: [facility] [address] [place] [address] [phone] [facility] [phone] FX     Last office visit with prescribing clinician: 8/13/2024   Last telemedicine visit with prescribing clinician: Visit date not found   Next office visit with prescribing clinician: 2/7/2025     Additional details provided by patient: DAVYLEY OUT    Does the patient have less than a 3 day supply:  [x] Yes  [] No    Would you like a call back once the refill request has been completed: [x] Yes [] No    If the office needs to give you a call back, can they leave a voicemail: [x] Yes [] No    Odell Kennedy Rep   12/11/24 12:51 CST

## 2025-01-13 DIAGNOSIS — F90.2 ATTENTION DEFICIT HYPERACTIVITY DISORDER (ADHD), COMBINED TYPE: Chronic | ICD-10-CM

## 2025-01-13 DIAGNOSIS — F51.01 PRIMARY INSOMNIA: ICD-10-CM

## 2025-01-13 RX ORDER — TRAZODONE HYDROCHLORIDE 50 MG/1
50 TABLET, FILM COATED ORAL NIGHTLY PRN
Qty: 30 TABLET | Refills: 5 | Status: SHIPPED | OUTPATIENT
Start: 2025-01-13

## 2025-01-13 RX ORDER — DEXTROAMPHETAMINE SACCHARATE, AMPHETAMINE ASPARTATE MONOHYDRATE, DEXTROAMPHETAMINE SULFATE AND AMPHETAMINE SULFATE 7.5; 7.5; 7.5; 7.5 MG/1; MG/1; MG/1; MG/1
30 CAPSULE, EXTENDED RELEASE ORAL DAILY
Qty: 30 CAPSULE | Refills: 0 | Status: SHIPPED | OUTPATIENT
Start: 2025-01-13

## 2025-01-13 RX ORDER — DEXTROAMPHETAMINE SACCHARATE, AMPHETAMINE ASPARTATE, DEXTROAMPHETAMINE SULFATE AND AMPHETAMINE SULFATE 2.5; 2.5; 2.5; 2.5 MG/1; MG/1; MG/1; MG/1
10 TABLET ORAL DAILY
Qty: 30 TABLET | Refills: 0 | Status: SHIPPED | OUTPATIENT
Start: 2025-01-13

## 2025-01-13 NOTE — TELEPHONE ENCOUNTER
Rx Refill Note  Requested Prescriptions     Pending Prescriptions Disp Refills    amphetamine-dextroamphetamine XR (Adderall XR) 30 MG 24 hr capsule 30 capsule 0     Sig: Take 1 capsule by mouth Daily    amphetamine-dextroamphetamine (ADDERALL) 10 MG tablet 30 tablet 0     Sig: Take 1 tablet by mouth Daily.    traZODone (DESYREL) 50 MG tablet 30 tablet 5     Sig: Take 1 tablet by mouth At Night As Needed for Sleep.      Last office visit with office: 08/13/24  Next office visit with office: 02/07/25    UDS: 05/10/24    DATE OF LAST REFILL: 12/11/24    Controlled Substance Agreement: up to date    [place]          {TIP  Is Refill Pharmacy correct?:  Caterina Nunn MA  01/13/25, 13:40 CST

## 2025-01-13 NOTE — TELEPHONE ENCOUNTER
Caller: Tej Rod ALDO    Relationship: Self    Best call back number: [phone]     Requested Prescriptions:   Requested Prescriptions     Pending Prescriptions Disp Refills    amphetamine-dextroamphetamine XR (Adderall XR) 30 MG 24 hr capsule 30 capsule 0     Sig: Take 1 capsule by mouth Daily    amphetamine-dextroamphetamine (ADDERALL) 10 MG tablet 30 tablet 0     Sig: Take 1 tablet by mouth Daily.    traZODone (DESYREL) 50 MG tablet 30 tablet 5     Sig: Take 1 tablet by mouth At Night As Needed for Sleep.        Pharmacy where request should be sent: [place], [place] - [address] [facility] [phone] [facility] [phone]      Last office visit with prescribing clinician: 8/13/2024   Last telemedicine visit with prescribing clinician: Visit date not found   Next office visit with prescribing clinician: 2/7/2025     Additional details provided by patient: PATIENT IS OUT    Does the patient have less than a 3 day supply:  [x] Yes  [] No    Would you like a call back once the refill request has been completed: [x] Yes [] No    If the office needs to give you a call back, can they leave a voicemail: [x] Yes [] No    Odell Waller Rep   01/13/25 12:50 CST

## 2025-01-21 DIAGNOSIS — F90.2 ATTENTION DEFICIT HYPERACTIVITY DISORDER (ADHD), COMBINED TYPE: Chronic | ICD-10-CM

## 2025-01-21 RX ORDER — DEXTROAMPHETAMINE SACCHARATE, AMPHETAMINE ASPARTATE MONOHYDRATE, DEXTROAMPHETAMINE SULFATE AND AMPHETAMINE SULFATE 7.5; 7.5; 7.5; 7.5 MG/1; MG/1; MG/1; MG/1
30 CAPSULE, EXTENDED RELEASE ORAL DAILY
Qty: 30 CAPSULE | Refills: 0 | Status: SHIPPED | OUTPATIENT
Start: 2025-01-21

## 2025-01-21 NOTE — TELEPHONE ENCOUNTER
Rx Refill Note  Requested Prescriptions     Pending Prescriptions Disp Refills    amphetamine-dextroamphetamine XR (Adderall XR) 30 MG 24 hr capsule 30 capsule 0     Sig: Take 1 capsule by mouth Daily      Last office visit with office: 08/13/2024  Next office visit with office: 02/07/2025    UDS: 05/10/2024    DATE OF LAST REFILL: 01/13/225    Controlled Substance Agreement: up to date    JOSÉ OR REUBEN: WNL         {TIP  Is Refill Pharmacy correct?:  Kell Fermin MA  01/21/25, 09:52 CST

## 2025-01-21 NOTE — TELEPHONE ENCOUNTER
Caller: Rod Burnham    Relationship: Self    Best call back number: [phone]     Requested Prescriptions:   Requested Prescriptions     Pending Prescriptions Disp Refills    amphetamine-dextroamphetamine XR (Adderall XR) 30 MG 24 hr capsule 30 capsule 0     Sig: Take 1 capsule by mouth Daily        Pharmacy where request should be sent: [id number] [place] [place] [facility] [address] [facility] [phone] [facility] [phone] FX     Last office visit with prescribing clinician: 8/13/2024   Last telemedicine visit with prescribing clinician: Visit date not found   Next office visit with prescribing clinician: 2/7/2025     Additional details provided by patient: PATIENT IS ALMOST OUT, PHARMACY ONLY GAVE HIM 10 THE FIRST TIME.     Does the patient have less than a 3 day supply:  [x] Yes  [] No    Would you like a call back once the refill request has been completed: [x] Yes [] No    If the office needs to give you a call back, can they leave a voicemail: [x] Yes [] No    Odell Waller Rep   01/21/25 09:46 CST

## 2025-02-07 ENCOUNTER — OFFICE VISIT (OUTPATIENT)
Dept: FAMILY MEDICINE CLINIC | Facility: CLINIC | Age: 40
End: 2025-02-07
Payer: COMMERCIAL

## 2025-02-07 VITALS
WEIGHT: 182 LBS | SYSTOLIC BLOOD PRESSURE: 120 MMHG | HEIGHT: 70 IN | OXYGEN SATURATION: 99 % | DIASTOLIC BLOOD PRESSURE: 76 MMHG | HEART RATE: 66 BPM | BODY MASS INDEX: 26.05 KG/M2

## 2025-02-07 DIAGNOSIS — F51.01 PRIMARY INSOMNIA: ICD-10-CM

## 2025-02-07 DIAGNOSIS — F90.2 ATTENTION DEFICIT HYPERACTIVITY DISORDER (ADHD), COMBINED TYPE: Primary | ICD-10-CM

## 2025-02-07 PROCEDURE — 99214 OFFICE O/P EST MOD 30 MIN: CPT | Performed by: NURSE PRACTITIONER

## 2025-02-07 NOTE — PROGRESS NOTES
"Subjective   Chief Complaint:  Medication management    History of Present Illness  39-year-old male presents today with ADHD combined type-reports good focus and task completion urine drug screen and controlled substance paperwork is up-to-date until May 2025.  Reports sleep is going okay.  Has order for Imitrex but reports has not needed since the summer.    Past Medical, Surgical, Social, and Family History:  No Known Allergies   Past Medical History:   Diagnosis Date    ADHD (attention deficit hyperactivity disorder)       Past Surgical History:   Procedure Laterality Date    SHOULDER SURGERY Right       Social History     Socioeconomic History    Marital status:    Tobacco Use    Smoking status: Never    Smokeless tobacco: Never   Vaping Use    Vaping status: Never Used   Substance and Sexual Activity    Alcohol use: Never    Drug use: Not Currently     Types: Marijuana    Sexual activity: Defer      Family History   Problem Relation Age of Onset    Breast cancer Mother 53    Melanoma Father     Breast cancer Maternal Grandmother        Objective   Vital Signs  /76   Pulse 66   Ht 177.8 cm (70\")   Wt 82.6 kg (182 lb)   SpO2 99%   BMI 26.11 kg/m²    Physical Exam  Constitutional:       General: He is not in acute distress.  Cardiovascular:      Rate and Rhythm: Normal rate and regular rhythm.      Pulses: Normal pulses.      Heart sounds: No murmur heard.     No friction rub. No gallop.   Pulmonary:      Effort: Pulmonary effort is normal. No respiratory distress.      Breath sounds: Normal breath sounds. No wheezing or rhonchi.   Neurological:      Mental Status: He is alert.   Psychiatric:         Behavior: Behavior normal.         Judgment: Judgment normal.       Assessment & Plan   Assessment & Plan  Diagnoses and all orders for this visit:    1. Attention deficit hyperactivity disorder (ADHD), combined type (Primary)  Comments:  Continue Adderall XR 30 mg daily with a 10 mg booster dose " immediate release    2. Primary insomnia  Comments:  Continue trazodone 50 mg nightly as needed          Follow-up:  The patient will Return for 4 month CS medication management.    Records and Results Reviewed:  I reviewed current medications as given by patient and allergy list    : Hybrid YULIET Co- and Dragon Speech Recognition - No recording technology was used in the exam room during encounter.    Electronically signed by PUMA Pantoja, 02/07/25, 7:38 AM CST.

## 2025-02-21 DIAGNOSIS — F51.01 PRIMARY INSOMNIA: ICD-10-CM

## 2025-02-21 DIAGNOSIS — F90.2 ATTENTION DEFICIT HYPERACTIVITY DISORDER (ADHD), COMBINED TYPE: Chronic | ICD-10-CM

## 2025-02-21 RX ORDER — TRAZODONE HYDROCHLORIDE 50 MG/1
50 TABLET, FILM COATED ORAL NIGHTLY PRN
Qty: 30 TABLET | Refills: 5 | Status: SHIPPED | OUTPATIENT
Start: 2025-02-21

## 2025-02-21 RX ORDER — DEXTROAMPHETAMINE SACCHARATE, AMPHETAMINE ASPARTATE MONOHYDRATE, DEXTROAMPHETAMINE SULFATE AND AMPHETAMINE SULFATE 7.5; 7.5; 7.5; 7.5 MG/1; MG/1; MG/1; MG/1
30 CAPSULE, EXTENDED RELEASE ORAL DAILY
Qty: 30 CAPSULE | Refills: 0 | Status: SHIPPED | OUTPATIENT
Start: 2025-02-21

## 2025-02-21 RX ORDER — DEXTROAMPHETAMINE SACCHARATE, AMPHETAMINE ASPARTATE, DEXTROAMPHETAMINE SULFATE AND AMPHETAMINE SULFATE 2.5; 2.5; 2.5; 2.5 MG/1; MG/1; MG/1; MG/1
10 TABLET ORAL DAILY
Qty: 30 TABLET | Refills: 0 | Status: SHIPPED | OUTPATIENT
Start: 2025-02-21

## 2025-02-21 NOTE — TELEPHONE ENCOUNTER
Caller: Rod Burnham ALDO    Relationship: Self    Best call back number: [phone]     Requested Prescriptions:   Requested Prescriptions     Pending Prescriptions Disp Refills    amphetamine-dextroamphetamine XR (Adderall XR) 30 MG 24 hr capsule 30 capsule 0     Sig: Take 1 capsule by mouth Daily    amphetamine-dextroamphetamine (ADDERALL) 10 MG tablet 30 tablet 0     Sig: Take 1 tablet by mouth Daily.    traZODone (DESYREL) 50 MG tablet 30 tablet 5     Sig: Take 1 tablet by mouth At Night As Needed for Sleep.        Pharmacy where request should be sent: [facility] [address] [facility] [phone] [facility] [phone]      Last office visit with prescribing clinician: 2/7/2025   Last telemedicine visit with prescribing clinician: Visit date not found   Next office visit with prescribing clinician: 6/18/2025     Does the patient have less than a 3 day supply:  [x] Yes  [] No    Would you like a call back once the refill request has been completed: [x] Yes [] No    If the office needs to give you a call back, can they leave a voicemail: [x] Yes [] No    Odell Barber Rep   02/21/25 13:45 CST

## 2025-02-21 NOTE — TELEPHONE ENCOUNTER
Rx Refill Note  Requested Prescriptions     Pending Prescriptions Disp Refills    amphetamine-dextroamphetamine XR (Adderall XR) 30 MG 24 hr capsule 30 capsule 0     Sig: Take 1 capsule by mouth Daily    amphetamine-dextroamphetamine (ADDERALL) 10 MG tablet 30 tablet 0     Sig: Take 1 tablet by mouth Daily.    traZODone (DESYREL) 50 MG tablet 30 tablet 5     Sig: Take 1 tablet by mouth At Night As Needed for Sleep.      Last office visit with office: 02/07/2025  Next office visit with office: 06/18/2025    UDS: 05/10/2024    DATE OF LAST REFILL: 01/21/2025    Controlled Substance Agreement: up to date    JOSÉ OR ANTONIOP: WNL         {TIP  Is Refill Pharmacy correct?:  Kell Fermin MA  02/21/25, 15:23 CST

## 2025-02-26 DIAGNOSIS — F51.01 PRIMARY INSOMNIA: ICD-10-CM

## 2025-02-26 DIAGNOSIS — F90.2 ATTENTION DEFICIT HYPERACTIVITY DISORDER (ADHD), COMBINED TYPE: Chronic | ICD-10-CM

## 2025-02-26 RX ORDER — TRAZODONE HYDROCHLORIDE 50 MG/1
50 TABLET ORAL NIGHTLY PRN
Qty: 30 TABLET | Refills: 5 | Status: SHIPPED | OUTPATIENT
Start: 2025-02-26

## 2025-02-26 RX ORDER — DEXTROAMPHETAMINE SACCHARATE, AMPHETAMINE ASPARTATE MONOHYDRATE, DEXTROAMPHETAMINE SULFATE AND AMPHETAMINE SULFATE 7.5; 7.5; 7.5; 7.5 MG/1; MG/1; MG/1; MG/1
30 CAPSULE, EXTENDED RELEASE ORAL DAILY
Qty: 30 CAPSULE | Refills: 0 | Status: SHIPPED | OUTPATIENT
Start: 2025-02-26

## 2025-02-26 RX ORDER — DEXTROAMPHETAMINE SACCHARATE, AMPHETAMINE ASPARTATE, DEXTROAMPHETAMINE SULFATE AND AMPHETAMINE SULFATE 2.5; 2.5; 2.5; 2.5 MG/1; MG/1; MG/1; MG/1
10 TABLET ORAL DAILY
Qty: 30 TABLET | Refills: 0 | Status: SHIPPED | OUTPATIENT
Start: 2025-02-26

## 2025-02-26 NOTE — TELEPHONE ENCOUNTER
Caller: Rod Burnham    Relationship: Self    Best call back number:     [phone] (Home), REQUESTING A CALLBACK WHEN MEDICATIONS ARE SENT     Who are you requesting to speak with (clinical staff, provider,  specific staff member): CLINICAL    What was the call regarding:  THE PATIENT STATES THAT HE NEEDS THE amphetamine-dextroamphetamine (ADDERALL) 10 MG tablet ,amphetamine-dextroamphetamine XR (Adderall XR) 30 MG 24 hr capsule AND THE traZODone (DESYREL) 50 MG tablet TRANSFERRED TO [facility] ON [address]. THE PATIENT STATES THAT THEY WERE SENT TO THE WRONG PHARMACY.       
Called [facility] [place] [facility], spoke to Ana, scripts canceled.   
unknown

## 2025-03-26 ENCOUNTER — TELEPHONE (OUTPATIENT)
Dept: FAMILY MEDICINE CLINIC | Facility: CLINIC | Age: 40
End: 2025-03-26
Payer: COMMERCIAL

## 2025-03-26 DIAGNOSIS — F90.2 ATTENTION DEFICIT HYPERACTIVITY DISORDER (ADHD), COMBINED TYPE: Chronic | ICD-10-CM

## 2025-03-26 RX ORDER — DEXTROAMPHETAMINE SACCHARATE, AMPHETAMINE ASPARTATE, DEXTROAMPHETAMINE SULFATE AND AMPHETAMINE SULFATE 2.5; 2.5; 2.5; 2.5 MG/1; MG/1; MG/1; MG/1
10 TABLET ORAL DAILY
Qty: 30 TABLET | Refills: 0 | Status: SHIPPED | OUTPATIENT
Start: 2025-03-26

## 2025-03-26 RX ORDER — DEXTROAMPHETAMINE SACCHARATE, AMPHETAMINE ASPARTATE MONOHYDRATE, DEXTROAMPHETAMINE SULFATE AND AMPHETAMINE SULFATE 7.5; 7.5; 7.5; 7.5 MG/1; MG/1; MG/1; MG/1
30 CAPSULE, EXTENDED RELEASE ORAL DAILY
Qty: 30 CAPSULE | Refills: 0 | Status: SHIPPED | OUTPATIENT
Start: 2025-03-26

## 2025-03-26 NOTE — TELEPHONE ENCOUNTER
Rx Refill Note  Requested Prescriptions     Pending Prescriptions Disp Refills    amphetamine-dextroamphetamine XR (Adderall XR) 30 MG 24 hr capsule 30 capsule 0     Sig: Take 1 capsule by mouth Daily    amphetamine-dextroamphetamine (ADDERALL) 10 MG tablet 30 tablet 0     Sig: Take 1 tablet by mouth Daily.      Last office visit with office: 02/07/25  Next office visit with office: 06-18/25    UDS: 05/10/24    DATE OF LAST REFILL: 02/26/25    Controlled Substance Agreement: up to date    [place]         {TIP  Is Refill Pharmacy correct?:  Kierra Rashid MA  03/26/25, 08:14 CDT

## 2025-04-15 NOTE — TELEPHONE ENCOUNTER
[place]  Per Protocol Last Refill 07/17/2023    Rx Refill Note  Requested Prescriptions     Pending Prescriptions Disp Refills    amphetamine-dextroamphetamine XR (Adderall XR) 30 MG 24 hr capsule 30 capsule 0     Sig: Take 1 capsule by mouth Daily    amphetamine-dextroamphetamine (ADDERALL) 10 MG tablet 30 tablet 0     Sig: Take 1 tablet by mouth Daily.      Last office visit with prescribing clinician: 8/10/2023      Next office visit with prescribing clinician: 2/9/2024            Vinnie Recinos MA  08/16/23, 13:21 CDT     85

## 2025-04-28 DIAGNOSIS — F51.01 PRIMARY INSOMNIA: ICD-10-CM

## 2025-04-28 DIAGNOSIS — F90.2 ATTENTION DEFICIT HYPERACTIVITY DISORDER (ADHD), COMBINED TYPE: Chronic | ICD-10-CM

## 2025-04-28 RX ORDER — DEXTROAMPHETAMINE SACCHARATE, AMPHETAMINE ASPARTATE, DEXTROAMPHETAMINE SULFATE AND AMPHETAMINE SULFATE 2.5; 2.5; 2.5; 2.5 MG/1; MG/1; MG/1; MG/1
10 TABLET ORAL DAILY
Qty: 30 TABLET | Refills: 0 | Status: SHIPPED | OUTPATIENT
Start: 2025-04-28

## 2025-04-28 RX ORDER — TRAZODONE HYDROCHLORIDE 50 MG/1
50 TABLET ORAL NIGHTLY PRN
Qty: 30 TABLET | Refills: 5 | Status: SHIPPED | OUTPATIENT
Start: 2025-04-28

## 2025-04-28 RX ORDER — DEXTROAMPHETAMINE SACCHARATE, AMPHETAMINE ASPARTATE MONOHYDRATE, DEXTROAMPHETAMINE SULFATE AND AMPHETAMINE SULFATE 7.5; 7.5; 7.5; 7.5 MG/1; MG/1; MG/1; MG/1
30 CAPSULE, EXTENDED RELEASE ORAL DAILY
Qty: 30 CAPSULE | Refills: 0 | Status: SHIPPED | OUTPATIENT
Start: 2025-04-28

## 2025-04-28 NOTE — TELEPHONE ENCOUNTER
Caller: Tej Rod D    Relationship: Self    Best call back number:  [phone]      Requested Prescriptions     Pending Prescriptions Disp Refills    amphetamine-dextroamphetamine XR (Adderall XR) 30 MG 24 hr capsule 30 capsule 0     Sig: Take 1 capsule by mouth Daily    amphetamine-dextroamphetamine (ADDERALL) 10 MG tablet 30 tablet 0     Sig: Take 1 tablet by mouth Daily.    traZODone (DESYREL) 50 MG tablet 30 tablet 5     Sig: Take 1 tablet by mouth At Night As Needed for Sleep.        Pharmacy where request should be sent: [facility]/PHARMACY #[id number] - [place], [place] - [address] [facility] [phone] [facility] [phone]      Last office visit with prescribing clinician: 2/7/2025   Last telemedicine visit with prescribing clinician: Visit date not found   Next office visit with prescribing clinician: 6/18/2025     Additional details provided by patient:     LESS THAN 3 DAYS    Does the patient have less than a 3 day supply:  [x] Yes  [] No    Would you like a call back once the refill request has been completed: [] Yes [] No    If the office needs to give you a call back, can they leave a voicemail: [] Yes [] No    Odell Shea Rep   04/28/25 07:45 CDT

## 2025-04-28 NOTE — TELEPHONE ENCOUNTER
Rx Refill Note  Requested Prescriptions     Pending Prescriptions Disp Refills    amphetamine-dextroamphetamine XR (Adderall XR) 30 MG 24 hr capsule 30 capsule 0     Sig: Take 1 capsule by mouth Daily    amphetamine-dextroamphetamine (ADDERALL) 10 MG tablet 30 tablet 0     Sig: Take 1 tablet by mouth Daily.    traZODone (DESYREL) 50 MG tablet 30 tablet 5     Sig: Take 1 tablet by mouth At Night As Needed for Sleep.      Last office visit with office: 02/07/25  Next office visit with office: 06/18/25    UDS: 05/10/24    DATE OF LAST REFILL: 03/26/25    Controlled Substance Agreement: up to date    JOSÉ OR REUBEN: REUBEN Nunn MA  04/28/25, 11:24 CDT

## 2025-05-27 DIAGNOSIS — F51.01 PRIMARY INSOMNIA: ICD-10-CM

## 2025-05-27 DIAGNOSIS — F90.2 ATTENTION DEFICIT HYPERACTIVITY DISORDER (ADHD), COMBINED TYPE: Chronic | ICD-10-CM

## 2025-05-27 RX ORDER — TRAZODONE HYDROCHLORIDE 50 MG/1
50 TABLET ORAL NIGHTLY PRN
Qty: 30 TABLET | Refills: 5 | Status: SHIPPED | OUTPATIENT
Start: 2025-05-27

## 2025-05-27 RX ORDER — DEXTROAMPHETAMINE SACCHARATE, AMPHETAMINE ASPARTATE, DEXTROAMPHETAMINE SULFATE AND AMPHETAMINE SULFATE 2.5; 2.5; 2.5; 2.5 MG/1; MG/1; MG/1; MG/1
10 TABLET ORAL DAILY
Qty: 30 TABLET | Refills: 0 | Status: SHIPPED | OUTPATIENT
Start: 2025-05-27

## 2025-05-27 RX ORDER — DEXTROAMPHETAMINE SACCHARATE, AMPHETAMINE ASPARTATE MONOHYDRATE, DEXTROAMPHETAMINE SULFATE AND AMPHETAMINE SULFATE 7.5; 7.5; 7.5; 7.5 MG/1; MG/1; MG/1; MG/1
30 CAPSULE, EXTENDED RELEASE ORAL DAILY
Qty: 30 CAPSULE | Refills: 0 | Status: SHIPPED | OUTPATIENT
Start: 2025-05-27

## 2025-05-27 NOTE — TELEPHONE ENCOUNTER
Caller: Rod Burnham    Relationship: Self    Best call back number: [phone]    Requested Prescriptions:   Requested Prescriptions     Pending Prescriptions Disp Refills    amphetamine-dextroamphetamine (ADDERALL) 10 MG tablet 30 tablet 0     Sig: Take 1 tablet by mouth Daily.    amphetamine-dextroamphetamine XR (Adderall XR) 30 MG 24 hr capsule 30 capsule 0     Sig: Take 1 capsule by mouth Daily    traZODone (DESYREL) 50 MG tablet 30 tablet 5     Sig: Take 1 tablet by mouth At Night As Needed for Sleep.        Pharmacy where request should be sent: [facility]/PHARMACY #[id number] - [place], [place] - [address] [facility] [phone] [facility] [phone]      Last office visit with prescribing clinician: 2/7/2025   Last telemedicine visit with prescribing clinician: Visit date not found   Next office visit with prescribing clinician: 6/18/2025         Does the patient have less than a 3 day supply:  [x] Yes  [] No    Would you like a call back once the refill request has been completed: [] Yes [x] No        Odell Johnson Rep   05/27/25 14:02 CDT

## 2025-05-27 NOTE — TELEPHONE ENCOUNTER
Rx Refill Note  Requested Prescriptions     Pending Prescriptions Disp Refills    amphetamine-dextroamphetamine (ADDERALL) 10 MG tablet 30 tablet 0     Sig: Take 1 tablet by mouth Daily.    amphetamine-dextroamphetamine XR (Adderall XR) 30 MG 24 hr capsule 30 capsule 0     Sig: Take 1 capsule by mouth Daily    traZODone (DESYREL) 50 MG tablet 30 tablet 5     Sig: Take 1 tablet by mouth At Night As Needed for Sleep.      Last office visit with office: 02/07/25  Next office visit with office: 06/18/25    UDS: 05/10/24    DATE OF LAST REFILL: 04/28/25    Controlled Substance Agreement: not up to date    [place]         {TIP  Is Refill Pharmacy correct?:  Kierra Rashid MA  05/27/25, 14:04 CDT

## 2025-06-24 ENCOUNTER — OFFICE VISIT (OUTPATIENT)
Dept: FAMILY MEDICINE CLINIC | Facility: CLINIC | Age: 40
End: 2025-06-24
Payer: COMMERCIAL

## 2025-06-24 VITALS
HEART RATE: 63 BPM | DIASTOLIC BLOOD PRESSURE: 70 MMHG | TEMPERATURE: 97.1 F | SYSTOLIC BLOOD PRESSURE: 104 MMHG | OXYGEN SATURATION: 96 % | HEIGHT: 70 IN | BODY MASS INDEX: 24.71 KG/M2 | WEIGHT: 172.6 LBS

## 2025-06-24 DIAGNOSIS — F90.2 ATTENTION DEFICIT HYPERACTIVITY DISORDER (ADHD), COMBINED TYPE: Primary | ICD-10-CM

## 2025-06-24 DIAGNOSIS — F90.2 ATTENTION DEFICIT HYPERACTIVITY DISORDER (ADHD), COMBINED TYPE: Chronic | ICD-10-CM

## 2025-06-24 DIAGNOSIS — H93.8X2 EAR FULLNESS, LEFT: ICD-10-CM

## 2025-06-24 PROCEDURE — 99213 OFFICE O/P EST LOW 20 MIN: CPT | Performed by: NURSE PRACTITIONER

## 2025-06-24 RX ORDER — DEXTROAMPHETAMINE SACCHARATE, AMPHETAMINE ASPARTATE, DEXTROAMPHETAMINE SULFATE AND AMPHETAMINE SULFATE 2.5; 2.5; 2.5; 2.5 MG/1; MG/1; MG/1; MG/1
10 TABLET ORAL DAILY
Qty: 30 TABLET | Refills: 0 | Status: SHIPPED | OUTPATIENT
Start: 2025-06-24

## 2025-06-24 RX ORDER — DEXTROAMPHETAMINE SACCHARATE, AMPHETAMINE ASPARTATE MONOHYDRATE, DEXTROAMPHETAMINE SULFATE AND AMPHETAMINE SULFATE 7.5; 7.5; 7.5; 7.5 MG/1; MG/1; MG/1; MG/1
30 CAPSULE, EXTENDED RELEASE ORAL DAILY
Qty: 30 CAPSULE | Refills: 0 | Status: SHIPPED | OUTPATIENT
Start: 2025-06-24

## 2025-06-24 RX ORDER — FLUTICASONE PROPIONATE 50 MCG
2 SPRAY, SUSPENSION (ML) NASAL DAILY
Qty: 16 G | Refills: 5 | Status: SHIPPED | OUTPATIENT
Start: 2025-06-24

## 2025-06-24 NOTE — PROGRESS NOTES
"Subjective   Chief Complaint:  Ear fullness, ADHD reevaluation    History of Present Illness  This a 40-year-old male presents with ADHD reevaluation-on XR Adderall 30 mg daily with a booster dose of 10 mg later in the day.  Reports doing well on the regimen, no misuse, he is compliant with everything thus far-he is due for drug screen and a new contract today.  Also having some fullness of the left ear.    Past Medical, Surgical, Social, and Family History:  No Known Allergies   Past Medical History:   Diagnosis Date    ADHD (attention deficit hyperactivity disorder)       Past Surgical History:   Procedure Laterality Date    SHOULDER SURGERY Right       Social History     Socioeconomic History    Marital status:    Tobacco Use    Smoking status: Never    Smokeless tobacco: Never   Vaping Use    Vaping status: Never Used   Substance and Sexual Activity    Alcohol use: Never    Drug use: Not Currently     Types: Marijuana    Sexual activity: Defer      Family History   Problem Relation Age of Onset    Breast cancer Mother 53    Melanoma Father     Breast cancer Maternal Grandmother        Objective   Vital Signs  /70   Pulse 63   Temp 97.1 °F (36.2 °C) (Infrared)   Ht 177.8 cm (70\")   Wt 78.3 kg (172 lb 9.6 oz)   SpO2 96%   BMI 24.77 kg/m²    Physical Exam  Constitutional:       General: He is not in acute distress.  HENT:      Ears:      Comments: Right TM normal, left TM congested-also with thick clear postnasal drip  Cardiovascular:      Rate and Rhythm: Normal rate and regular rhythm.      Pulses: Normal pulses.      Heart sounds: No murmur heard.     No friction rub. No gallop.   Pulmonary:      Effort: Pulmonary effort is normal. No respiratory distress.      Breath sounds: Normal breath sounds. No wheezing or rhonchi.   Neurological:      Mental Status: He is alert.   Psychiatric:         Behavior: Behavior normal.         Judgment: Judgment normal.       Assessment & Plan   Assessment & " Plan  1.  Ear congestion left ear  - Flonase 1 spray each nare daily    2.  ADHD  - Chronic/stable  - Continue Adderall XR 30 mg daily  - Continue Adderall 10 mg booster dose in afternoon    Follow-up:  The patient will Return for 4 month CS medication management.    Records and Results Reviewed:  I reviewed current medications as given by patient and allergy list.    : Hybrid YUL[employer]- and Dragon Speech Recognition - No recording technology was used in the exam room during encounter.    Electronically signed by PUMA Pantoja, 06/24/25, 7:43 AM CDT.

## 2025-06-25 LAB
AMPHETAMINES UR QL SCN: POSITIVE NG/ML
BARBITURATES UR QL SCN: NEGATIVE NG/ML
BENZODIAZ UR QL SCN: NEGATIVE NG/ML
BZE UR QL SCN: NEGATIVE NG/ML
CANNABINOIDS UR QL SCN: POSITIVE NG/ML
CREAT UR-MCNC: 100.8 MG/DL (ref 20–300)
LABORATORY COMMENT REPORT: ABNORMAL
METHADONE UR QL SCN: NEGATIVE NG/ML
OPIATES UR QL SCN: NEGATIVE NG/ML
OXYCODONE+OXYMORPHONE UR QL SCN: NEGATIVE NG/ML
PCP UR QL: NEGATIVE NG/ML
PH UR: 5 [PH] (ref 4.5–8.9)
PROPOXYPH UR QL SCN: NEGATIVE NG/ML

## 2025-07-28 DIAGNOSIS — F90.2 ATTENTION DEFICIT HYPERACTIVITY DISORDER (ADHD), COMBINED TYPE: Chronic | ICD-10-CM

## 2025-07-28 RX ORDER — DEXTROAMPHETAMINE SACCHARATE, AMPHETAMINE ASPARTATE MONOHYDRATE, DEXTROAMPHETAMINE SULFATE AND AMPHETAMINE SULFATE 7.5; 7.5; 7.5; 7.5 MG/1; MG/1; MG/1; MG/1
30 CAPSULE, EXTENDED RELEASE ORAL DAILY
Qty: 30 CAPSULE | Refills: 0 | Status: SHIPPED | OUTPATIENT
Start: 2025-07-28

## 2025-07-28 RX ORDER — DEXTROAMPHETAMINE SACCHARATE, AMPHETAMINE ASPARTATE, DEXTROAMPHETAMINE SULFATE AND AMPHETAMINE SULFATE 2.5; 2.5; 2.5; 2.5 MG/1; MG/1; MG/1; MG/1
10 TABLET ORAL DAILY
Qty: 30 TABLET | Refills: 0 | Status: SHIPPED | OUTPATIENT
Start: 2025-07-28

## 2025-07-28 NOTE — TELEPHONE ENCOUNTER
Caller: Tej Rod D    Relationship: Self    Best call back number: [phone]     Requested Prescriptions:   Requested Prescriptions     Pending Prescriptions Disp Refills    amphetamine-dextroamphetamine XR (Adderall XR) 30 MG 24 hr capsule 30 capsule 0     Sig: Take 1 capsule by mouth Daily    amphetamine-dextroamphetamine (ADDERALL) 10 MG tablet 30 tablet 0     Sig: Take 1 tablet by mouth Daily.        Pharmacy where request should be sent: [facility]/PHARMACY #[id number] - [place], [place] - [address] [facility] [phone] [facility] [phone]      Last office visit with prescribing clinician: 6/24/2025   Last telemedicine visit with prescribing clinician: Visit date not found   Next office visit with prescribing clinician: 10/27/2025     Additional details provided by patient:     Does the patient have less than a 3 day supply:  [x] Yes  [] No    Would you like a call back once the refill request has been completed: [x] Yes [] No    If the office needs to give you a call back, can they leave a voicemail: [x] Yes [] No    Odell Waller Rep   07/28/25 10:30 CDT

## 2025-07-28 NOTE — TELEPHONE ENCOUNTER
Rx Refill Note  Requested Prescriptions     Pending Prescriptions Disp Refills    amphetamine-dextroamphetamine XR (Adderall XR) 30 MG 24 hr capsule 30 capsule 0     Sig: Take 1 capsule by mouth Daily    amphetamine-dextroamphetamine (ADDERALL) 10 MG tablet 30 tablet 0     Sig: Take 1 tablet by mouth Daily.      Last office visit with office: 06/24/25  Next office visit with office: 10/27/25    UDS: 06/24/25    DATE OF LAST REFILL: 10mg- 07/01/25, 30mg-06/24/25    Controlled Substance Agreement: up to date    JOSÉ OR REUBEN: REUBEN Nunn MA  07/28/25, 11:25 CDT